# Patient Record
Sex: FEMALE | Race: WHITE | NOT HISPANIC OR LATINO | Employment: OTHER | URBAN - METROPOLITAN AREA
[De-identification: names, ages, dates, MRNs, and addresses within clinical notes are randomized per-mention and may not be internally consistent; named-entity substitution may affect disease eponyms.]

---

## 2017-03-31 ENCOUNTER — OFFICE VISIT - HEALTHEAST (OUTPATIENT)
Dept: FAMILY MEDICINE | Facility: CLINIC | Age: 52
End: 2017-03-31

## 2017-03-31 DIAGNOSIS — M25.562 LEFT LATERAL KNEE PAIN: ICD-10-CM

## 2017-03-31 ASSESSMENT — MIFFLIN-ST. JEOR: SCORE: 1369.75

## 2017-04-18 ENCOUNTER — RECORDS - HEALTHEAST (OUTPATIENT)
Dept: ADMINISTRATIVE | Facility: OTHER | Age: 52
End: 2017-04-18

## 2017-04-24 ENCOUNTER — HOSPITAL ENCOUNTER (OUTPATIENT)
Dept: MRI IMAGING | Facility: CLINIC | Age: 52
Discharge: HOME | End: 2017-04-24
Attending: ORTHOPAEDIC SURGERY

## 2017-04-24 DIAGNOSIS — M25.562 LEFT KNEE PAIN: ICD-10-CM

## 2017-05-01 ENCOUNTER — RECORDS - HEALTHEAST (OUTPATIENT)
Dept: ADMINISTRATIVE | Facility: OTHER | Age: 52
End: 2017-05-01

## 2017-05-02 ENCOUNTER — COMMUNICATION - HEALTHEAST (OUTPATIENT)
Dept: FAMILY MEDICINE | Facility: CLINIC | Age: 52
End: 2017-05-02

## 2017-05-03 ENCOUNTER — COMMUNICATION - HEALTHEAST (OUTPATIENT)
Dept: FAMILY MEDICINE | Facility: CLINIC | Age: 52
End: 2017-05-03

## 2017-05-19 ENCOUNTER — OFFICE VISIT - HEALTHEAST (OUTPATIENT)
Dept: FAMILY MEDICINE | Facility: CLINIC | Age: 52
End: 2017-05-19

## 2017-05-19 DIAGNOSIS — S83.289A LATERAL MENISCUS TEAR: ICD-10-CM

## 2017-05-19 DIAGNOSIS — M25.562 KNEE PAIN, LEFT: ICD-10-CM

## 2017-05-19 DIAGNOSIS — Z01.818 VISIT FOR PRE-OPERATIVE EXAMINATION: ICD-10-CM

## 2017-05-19 ASSESSMENT — MIFFLIN-ST. JEOR: SCORE: 1395.55

## 2017-05-24 ENCOUNTER — SURGERY - HEALTHEAST (OUTPATIENT)
Dept: SURGERY | Facility: CLINIC | Age: 52
End: 2017-05-24

## 2017-05-24 ENCOUNTER — ANESTHESIA - HEALTHEAST (OUTPATIENT)
Dept: SURGERY | Facility: CLINIC | Age: 52
End: 2017-05-24

## 2017-05-24 ASSESSMENT — MIFFLIN-ST. JEOR: SCORE: 1375.99

## 2017-06-06 ENCOUNTER — RECORDS - HEALTHEAST (OUTPATIENT)
Dept: ADMINISTRATIVE | Facility: OTHER | Age: 52
End: 2017-06-06

## 2017-06-12 ENCOUNTER — AMBULATORY - HEALTHEAST (OUTPATIENT)
Dept: ADMINISTRATIVE | Facility: REHABILITATION | Age: 52
End: 2017-06-12

## 2017-06-12 DIAGNOSIS — M25.562 LEFT KNEE PAIN: ICD-10-CM

## 2017-06-13 ENCOUNTER — OFFICE VISIT - HEALTHEAST (OUTPATIENT)
Dept: PHYSICAL THERAPY | Facility: REHABILITATION | Age: 52
End: 2017-06-13

## 2017-06-13 DIAGNOSIS — Z98.890 S/P ARTHROSCOPY OF LEFT KNEE: ICD-10-CM

## 2017-06-13 DIAGNOSIS — G89.18 POST-OP PAIN: ICD-10-CM

## 2017-06-13 DIAGNOSIS — M25.469 EFFUSION OF LOWER LEG JOINT: ICD-10-CM

## 2017-06-13 DIAGNOSIS — M25.662 KNEE STIFFNESS, LEFT: ICD-10-CM

## 2017-06-16 ENCOUNTER — OFFICE VISIT - HEALTHEAST (OUTPATIENT)
Dept: PHYSICAL THERAPY | Facility: REHABILITATION | Age: 52
End: 2017-06-16

## 2017-06-16 DIAGNOSIS — M25.469 EFFUSION OF LOWER LEG JOINT: ICD-10-CM

## 2017-06-16 DIAGNOSIS — Z98.890 S/P ARTHROSCOPY OF LEFT KNEE: ICD-10-CM

## 2017-06-16 DIAGNOSIS — G89.18 POST-OP PAIN: ICD-10-CM

## 2017-06-16 DIAGNOSIS — M25.662 KNEE STIFFNESS, LEFT: ICD-10-CM

## 2017-06-19 ENCOUNTER — OFFICE VISIT - HEALTHEAST (OUTPATIENT)
Dept: PHYSICAL THERAPY | Facility: REHABILITATION | Age: 52
End: 2017-06-19

## 2017-06-19 DIAGNOSIS — M25.662 KNEE STIFFNESS, LEFT: ICD-10-CM

## 2017-06-19 DIAGNOSIS — M25.469 EFFUSION OF LOWER LEG JOINT: ICD-10-CM

## 2017-06-19 DIAGNOSIS — G89.18 POST-OP PAIN: ICD-10-CM

## 2017-06-19 DIAGNOSIS — Z98.890 S/P ARTHROSCOPY OF LEFT KNEE: ICD-10-CM

## 2017-06-23 ENCOUNTER — OFFICE VISIT - HEALTHEAST (OUTPATIENT)
Dept: PHYSICAL THERAPY | Facility: REHABILITATION | Age: 52
End: 2017-06-23

## 2017-06-23 DIAGNOSIS — G89.18 POST-OP PAIN: ICD-10-CM

## 2017-06-23 DIAGNOSIS — M25.469 EFFUSION OF LOWER LEG JOINT: ICD-10-CM

## 2017-06-23 DIAGNOSIS — Z98.890 S/P ARTHROSCOPY OF LEFT KNEE: ICD-10-CM

## 2017-06-23 DIAGNOSIS — M25.662 KNEE STIFFNESS, LEFT: ICD-10-CM

## 2017-06-26 ENCOUNTER — OFFICE VISIT - HEALTHEAST (OUTPATIENT)
Dept: PHYSICAL THERAPY | Facility: REHABILITATION | Age: 52
End: 2017-06-26

## 2017-06-26 DIAGNOSIS — M25.469 EFFUSION OF LOWER LEG JOINT: ICD-10-CM

## 2017-06-26 DIAGNOSIS — Z98.890 S/P ARTHROSCOPY OF LEFT KNEE: ICD-10-CM

## 2017-06-26 DIAGNOSIS — M25.662 KNEE STIFFNESS, LEFT: ICD-10-CM

## 2017-06-30 ENCOUNTER — OFFICE VISIT - HEALTHEAST (OUTPATIENT)
Dept: PHYSICAL THERAPY | Facility: REHABILITATION | Age: 52
End: 2017-06-30

## 2017-06-30 DIAGNOSIS — G89.18 POST-OP PAIN: ICD-10-CM

## 2017-06-30 DIAGNOSIS — M25.469 EFFUSION OF LOWER LEG JOINT: ICD-10-CM

## 2017-06-30 DIAGNOSIS — M25.662 KNEE STIFFNESS, LEFT: ICD-10-CM

## 2017-06-30 DIAGNOSIS — Z98.890 S/P ARTHROSCOPY OF LEFT KNEE: ICD-10-CM

## 2017-07-03 ENCOUNTER — OFFICE VISIT - HEALTHEAST (OUTPATIENT)
Dept: PHYSICAL THERAPY | Facility: REHABILITATION | Age: 52
End: 2017-07-03

## 2017-07-03 DIAGNOSIS — Z98.890 S/P ARTHROSCOPY OF LEFT KNEE: ICD-10-CM

## 2017-07-03 DIAGNOSIS — M25.469 EFFUSION OF LOWER LEG JOINT: ICD-10-CM

## 2017-07-03 DIAGNOSIS — M25.662 KNEE STIFFNESS, LEFT: ICD-10-CM

## 2017-07-03 DIAGNOSIS — G89.18 POST-OP PAIN: ICD-10-CM

## 2017-07-07 ENCOUNTER — OFFICE VISIT - HEALTHEAST (OUTPATIENT)
Dept: PHYSICAL THERAPY | Facility: REHABILITATION | Age: 52
End: 2017-07-07

## 2017-07-07 DIAGNOSIS — M25.469 EFFUSION OF LOWER LEG JOINT: ICD-10-CM

## 2017-07-07 DIAGNOSIS — M25.662 KNEE STIFFNESS, LEFT: ICD-10-CM

## 2017-07-07 DIAGNOSIS — G89.18 POST-OP PAIN: ICD-10-CM

## 2017-07-07 DIAGNOSIS — Z98.890 S/P ARTHROSCOPY OF LEFT KNEE: ICD-10-CM

## 2017-07-10 ENCOUNTER — RECORDS - HEALTHEAST (OUTPATIENT)
Dept: ADMINISTRATIVE | Facility: OTHER | Age: 52
End: 2017-07-10

## 2017-08-28 ENCOUNTER — RECORDS - HEALTHEAST (OUTPATIENT)
Dept: ADMINISTRATIVE | Facility: OTHER | Age: 52
End: 2017-08-28

## 2017-09-24 ENCOUNTER — HEALTH MAINTENANCE LETTER (OUTPATIENT)
Age: 52
End: 2017-09-24

## 2018-01-30 ENCOUNTER — OFFICE VISIT - HEALTHEAST (OUTPATIENT)
Dept: FAMILY MEDICINE | Facility: CLINIC | Age: 53
End: 2018-01-30

## 2018-01-30 DIAGNOSIS — Z76.89 ESTABLISHING CARE WITH NEW DOCTOR, ENCOUNTER FOR: ICD-10-CM

## 2018-01-30 DIAGNOSIS — Z23 NEED FOR VACCINATION: ICD-10-CM

## 2018-01-30 DIAGNOSIS — Z12.31 VISIT FOR SCREENING MAMMOGRAM: ICD-10-CM

## 2018-01-30 DIAGNOSIS — D22.9 ATYPICAL MOLE: ICD-10-CM

## 2018-01-30 ASSESSMENT — MIFFLIN-ST. JEOR: SCORE: 1438.64

## 2018-01-31 ENCOUNTER — RECORDS - HEALTHEAST (OUTPATIENT)
Dept: ADMINISTRATIVE | Facility: OTHER | Age: 53
End: 2018-01-31

## 2018-04-27 ENCOUNTER — COMMUNICATION - HEALTHEAST (OUTPATIENT)
Dept: SCHEDULING | Facility: CLINIC | Age: 53
End: 2018-04-27

## 2018-04-27 ENCOUNTER — OFFICE VISIT - HEALTHEAST (OUTPATIENT)
Dept: FAMILY MEDICINE | Facility: CLINIC | Age: 53
End: 2018-04-27

## 2018-04-27 DIAGNOSIS — J20.9 BRONCHITIS, ACUTE: ICD-10-CM

## 2018-08-20 ENCOUNTER — COMMUNICATION - HEALTHEAST (OUTPATIENT)
Dept: FAMILY MEDICINE | Facility: CLINIC | Age: 53
End: 2018-08-20

## 2018-08-21 ENCOUNTER — AMBULATORY - HEALTHEAST (OUTPATIENT)
Dept: FAMILY MEDICINE | Facility: CLINIC | Age: 53
End: 2018-08-21

## 2018-08-21 DIAGNOSIS — M25.562 CHRONIC PAIN OF LEFT KNEE: ICD-10-CM

## 2018-08-21 DIAGNOSIS — G89.29 CHRONIC PAIN OF LEFT KNEE: ICD-10-CM

## 2018-08-27 ENCOUNTER — RECORDS - HEALTHEAST (OUTPATIENT)
Dept: ADMINISTRATIVE | Facility: OTHER | Age: 53
End: 2018-08-27

## 2020-02-25 ENCOUNTER — COMMUNICATION - HEALTHEAST (OUTPATIENT)
Dept: SCHEDULING | Facility: CLINIC | Age: 55
End: 2020-02-25

## 2020-02-25 ENCOUNTER — RECORDS - HEALTHEAST (OUTPATIENT)
Dept: ADMINISTRATIVE | Facility: OTHER | Age: 55
End: 2020-02-25

## 2020-10-01 ENCOUNTER — OFFICE VISIT - HEALTHEAST (OUTPATIENT)
Dept: FAMILY MEDICINE | Facility: CLINIC | Age: 55
End: 2020-10-01

## 2020-10-01 DIAGNOSIS — Z23 NEED FOR VACCINATION: ICD-10-CM

## 2020-10-01 DIAGNOSIS — N83.8 OVARIAN MASS: ICD-10-CM

## 2020-10-01 DIAGNOSIS — Z12.31 VISIT FOR SCREENING MAMMOGRAM: ICD-10-CM

## 2020-10-01 DIAGNOSIS — Z12.11 COLON CANCER SCREENING: ICD-10-CM

## 2020-10-01 ASSESSMENT — MIFFLIN-ST. JEOR: SCORE: 1530.78

## 2021-01-29 ENCOUNTER — COMMUNICATION - HEALTHEAST (OUTPATIENT)
Dept: FAMILY MEDICINE | Facility: CLINIC | Age: 56
End: 2021-01-29

## 2021-01-29 ENCOUNTER — AMBULATORY - HEALTHEAST (OUTPATIENT)
Dept: FAMILY MEDICINE | Facility: CLINIC | Age: 56
End: 2021-01-29

## 2021-01-29 DIAGNOSIS — Z11.1 SCREENING EXAMINATION FOR PULMONARY TUBERCULOSIS: ICD-10-CM

## 2021-02-02 ENCOUNTER — AMBULATORY - HEALTHEAST (OUTPATIENT)
Dept: NURSING | Facility: CLINIC | Age: 56
End: 2021-02-02

## 2021-02-02 DIAGNOSIS — Z11.1 SCREENING EXAMINATION FOR PULMONARY TUBERCULOSIS: ICD-10-CM

## 2021-02-04 ENCOUNTER — AMBULATORY - HEALTHEAST (OUTPATIENT)
Dept: NURSING | Facility: CLINIC | Age: 56
End: 2021-02-04

## 2021-05-30 VITALS — HEIGHT: 66 IN | WEIGHT: 168.44 LBS | BODY MASS INDEX: 27.07 KG/M2

## 2021-05-30 VITALS — HEIGHT: 67 IN | BODY MASS INDEX: 26.24 KG/M2 | WEIGHT: 167.19 LBS

## 2021-05-30 VITALS — HEIGHT: 66 IN | WEIGHT: 174.13 LBS | BODY MASS INDEX: 27.98 KG/M2

## 2021-05-31 ENCOUNTER — RECORDS - HEALTHEAST (OUTPATIENT)
Dept: ADMINISTRATIVE | Facility: CLINIC | Age: 56
End: 2021-05-31

## 2021-05-31 VITALS — BODY MASS INDEX: 28.41 KG/M2 | HEIGHT: 67 IN | WEIGHT: 181 LBS

## 2021-06-01 ENCOUNTER — RECORDS - HEALTHEAST (OUTPATIENT)
Dept: ADMINISTRATIVE | Facility: CLINIC | Age: 56
End: 2021-06-01

## 2021-06-01 VITALS — BODY MASS INDEX: 28.86 KG/M2 | WEIGHT: 184.25 LBS

## 2021-06-02 ENCOUNTER — RECORDS - HEALTHEAST (OUTPATIENT)
Dept: ADMINISTRATIVE | Facility: CLINIC | Age: 56
End: 2021-06-02

## 2021-06-04 VITALS
BODY MASS INDEX: 31.6 KG/M2 | HEIGHT: 67 IN | DIASTOLIC BLOOD PRESSURE: 82 MMHG | OXYGEN SATURATION: 98 % | RESPIRATION RATE: 18 BRPM | SYSTOLIC BLOOD PRESSURE: 118 MMHG | TEMPERATURE: 98.2 F | HEART RATE: 76 BPM | WEIGHT: 201.31 LBS

## 2021-06-06 NOTE — TELEPHONE ENCOUNTER
"Pt reports \"chest pressure.\"  \"First started 6-to-8 months ago.  \"Only happened 2-to-3 x over these past months.\"    Yesterday occurred \"just once.\"  Lasted \"about one minute.\"  \"Just sat in a chair and took deep breaths until pressure subsided.\"  \"Thought maybe I was having a heart attack.\"    \"Today still having a vague sensation in the chest.\"  \"Ran up the stairs and felt a little winded today.\"  \"Feeling a little off today.\"    Advised ED eval.  Pt declines ED; asks for some other option.  Suggested pt may start at [facility], while remaining flexible in the event actual hospital intervention is needed.    Elba Celis RN  [facility] Triage     Reason for Disposition    Intermittent chest pain and pain has been increasing in severity or frequency    Protocols used: CHEST PAIN-A-OH      "

## 2021-06-09 NOTE — PROGRESS NOTES
"Assessment:     There are no diagnoses linked to this encounter.       Plan:        1. Left lateral knee pain  Ambulatory referral to Orthopedics       We reviewed potential etiologies for the knee symptoms and I will refer to ortho for further evaluation. We discussed quadriceps strengthening exercises, avoidance of aggravating activities, and use of a neoprene knee support with the patella exposed. She will use otc analgesics and call or return to clinic with worsening or persisting symptoms.      Subjective:         Ghada Ochoa is a 51 y.o. female who presents with knee pain involving the left knee. Onset was gradual, starting about 10 days ago and getting progressively worse. Inciting event: none known, but she had been flying for 5 hours prior to onset. Current symptoms include: pain located anterolateral inferior aspect of knee, deep pain. She denies giving out, locking, popping sensation and swelling. Pain is aggravated by going up and down stairs and standing. Patient has had no prior knee problems. Evaluation to date: none. Treatment to date: avoidance of offending activity, ice and OTC analgesics which are somewhat effective.    Review of Systems  Pertinent items are noted in HPI.     Objective:      /74  Pulse 64  Ht 5' 6.25\" (1.683 m)  Wt 168 lb 7 oz (76.4 kg)  BMI 26.98 kg/m2    GEN: Alert and oriented, NAD, well nourished  SKIN:  Normal skin turgor, no lesions/rashes   HEENT: NC/AT, moist mucous membranes, no rhinorrhea.    NECK: Normal.  No adenopathy or thyromegaly.  CV: Regular rate and rhythm, no murmurs.   LUNGS: Clear to auscultation bilaterally.    ABDOMEN: Soft, non-tender, non-distended, no masses   BACK: Normal  EXTREMITY: No edema, cyanosis  NEURO: Grossly normal.       Right knee: normal and no effusion, full active range of motion, no joint line tenderness, ligamentous structures intact.   Left knee:  mild effusion. Full active range of motion, no joint line tenderness, " ligamentous structures intact.

## 2021-06-10 NOTE — ANESTHESIA CARE TRANSFER NOTE
Last vitals:   Vitals:    05/24/17 1543   BP: 108/58   Pulse: 97   Resp: 12   Temp: 36.4  C (97.5  F)   SpO2: 99%     Patient's level of consciousness is awake and drowsy  Spontaneous respirations: yes  Maintains airway independently: yes  Dentition unchanged: yes  Oropharynx: oropharynx clear of all foreign objects    QCDR Measures:  ASA# 20 - Surgical Safety Checklist: ASA20A - Safety Checks Done  PQRS# 430 - Adult PONV Prevention: 4558F - Pt received => 2 anti-emetic agents (different classes) preop & intraop  ASA# 8 - Peds PONV Prevention: NA - Not pediatric patient, not GA or 2 or more risk factors NOT present  PQRS# 424 - Meera-op Temp Management: 4559F - At least one body temp DOCUMENTED => 35.5C or 95.9F within required timeframe  PQRS# 426 - PACU Transfer Protocol: - Transfer of care checklist used  ASA# 14 - Acute Post-op Pain: ASA14B - Patient did NOT experience pain >= 7 out of 10

## 2021-06-10 NOTE — ANESTHESIA POSTPROCEDURE EVALUATION
Patient: Ghada Ochoa  LEFT KNEE ARTHROSCOPY WITH SUBTOTAL LATERAL MENISCECTOMY  Anesthesia type: general    Patient location: Phase II Recovery  Last vitals:   Vitals:    05/24/17 1600   BP: 127/63   Pulse: 71   Resp: 16   Temp:    SpO2: 99%     Post vital signs: stable  Level of consciousness: awake, alert and oriented  Post-anesthesia pain: pain controlled  Post-anesthesia nausea and vomiting: no  Pulmonary: unassisted, return to baseline  Cardiovascular: stable and blood pressure at baseline  Hydration: adequate  Anesthetic events: no    QCDR Measures:  ASA# 11 - Meera-op Cardiac Arrest: ASA11B - Patient did NOT experience unanticipated cardiac arrest  ASA# 12 - Meera-op Mortality Rate: ASA12B - Patient did NOT die  ASA# 13 - PACU Re-Intubation Rate: NA - No ETT / LMA used for case  ASA# 10 - Composite Anes Safety: ASA10A - No serious adverse event  ASA# 38 - New Corneal Injury: ASA38A - No new exposure keratitis or corneal abrasion in PACU    Additional Notes:   - - -

## 2021-06-10 NOTE — PROGRESS NOTES
Assessment/Plan:      Visit for Preoperative Exam.  1. Visit for pre-operative examination    2. Lateral meniscus tear    3. Knee pain, left        Patient approved for surgery with general or local anesthesia. Labs drawn as indicated below. We reviewed holding NSAIDs at least 7 days prior to surgery. We reviewed indications for re-evaluation, and she will follow up as needed.      Patient is approved for surgery and is considered to be mod anesthetic risk.      Subjective:     Scheduled Procedure: Left Knee Arthroscopy  Surgery Date:  05/24/17  Surgery Location:  [facility]   Surgeon:  Dr Wright    History of Present Illness      Ghada Ochoa is a 51 y.o. year-old female who presents for a pre-op exam prior to undergoing knee arthroscopy for probable lateral meniscal tear. She has had pain for the past 2 mos.    Recent Health  Fever: no  Chills: no  Fatigue: no  Chest Pain: no  Cough: no  Dyspnea: no  Urinary Frequency: no  Nausea: no  Vomiting: no  Diarrhea: no  Abdominal Pain: no  Easy Bruising: no  Lower Extremity Swelling: no  Poor Exercise Tolerance: no    Pertinent History  Prior Anesthesia: yes  Previous Anesthesia Reaction:  no  Diabetes: no  Cardiovascular Disease: no  Pulmonary Disease: no  Renal Disease: no  GI Disease: no  Sleep Apnea: no  Thromboembolic Problems: no  Clotting Disorder: no  Bleeding Disorder: no  Transfusion Reaction: n/a  Impaired Immunity: no  Steroid use in the last 6 months: no  Frequent Aspirin use: yes - started baby asa preoperatively    Family history negative for anesthesia reaction, Stroke, Aneurysm, sudden death, clotting disorder and bleeding disorder    Father had CAD, s/p MI.     Social history of patient does not wear denture or partial plates, there is no transfusion refusal, NSAID use and there are no concerns regarding care after surgery    After surgery, the patient plans to recover at home with family.    Current Outpatient Prescriptions   Medication Sig Dispense  Refill     aspirin 81 MG EC tablet Take 81 mg by mouth daily.       ibuprofen (ADVIL,MOTRIN) 400 MG tablet Take 400 mg by mouth every 6 (six) hours as needed for pain.       LACTOBAC CMB #3/FOS/PANTETHINE (PROBIOTIC & ACIDOPHILUS ORAL) Take by mouth.       MULTIVIT WITH CALCIUM,IRON,MIN (MULTIPLE VITAMIN, WOMENS ORAL) Take by mouth.       No current facility-administered medications for this visit.        No Known Allergies    Immunization History   Administered Date(s) Administered     Td, historic 06/26/2000, 10/09/2007     Tdap 10/09/2007       Patient Active Problem List   Diagnosis     Lateral meniscus tear     Knee pain, left       Past Medical History:   Diagnosis Date     Gallstone pancreatitis      Ovarian mass 2009    cellular fibroma       Past Surgical History:   Procedure Laterality Date     partial oopherectomy due to 9 cm mass       CO APPENDECTOMY      Description: Appendectomy;  Recorded: 05/28/2009;     CO LAP,CHOLECYSTECTOMY      Description: Cholecystectomy Laparoscopic;  Recorded: 05/28/2009;     CO REMOVE TONSILS/ADENOIDS,<13 Y/O      Description: Tonsillectomy With Adenoidectomy;  Recorded: 12/11/2007;       Family History   Problem Relation Age of Onset     Heart disease Father      Cancer Father      waldenstrom's macroglobulinemia       Social History     Social History     Marital status:      Spouse name: Toribio     Number of children: 5     Years of education: N/A     Occupational History      [employer]     Social History Main Topics     Smoking status: Never Smoker     Smokeless tobacco: Never Used     Alcohol use 0.5 oz/week     1 drink(s) per week     Drug use: No     Sexual activity: Yes     Partners: Male     Birth control/ protection: Surgical     Other Topics Concern     Not on file     Social History Narrative       Review of Systems  A 12 point comprehensive review of systems was negative except as noted.       Objective:       Vitals:    05/19/17 1419   BP:  "122/68   Pulse: (!) 52   Weight: 174 lb 2 oz (79 kg)   Height: 5' 6.25\" (1.683 m)     Physical Exam:  General: Alert, cooperative, no distress  Head: Normocephalic, without obvious abnormality, atraumatic  Eyes: PERRL, conjunctiva/corneas clear, EOM's intact  Ears: Normal TM's and external ear canals, both ears  Nose: Nares normal, septum midline,mucosa normal, no drainage  Throat: Lips, mucosa, and tongue normal; teeth and gums normal  Neck: Supple, symmetrical, trachea midline, no adenopathy;  thyroid normal  Back: Symmetric, no curvature, ROM normal, no CVA tenderness  Lungs: Clear to auscultation bilaterally, respirations unlabored  Heart: Regular rate and rhythm, S1 and S2 normal, no murmur, rub, or gallop  Abdomen: Soft, non-tender, no masses, no organomegaly  Extremities: Extremities normal, atraumatic, no cyanosis or edema  Skin: Skin color, texture, turgor normal, no rashes or lesions  Lymph nodes: Cervical, supraclavicular, and axillary nodes normal  Neurologic: Normal        Results for orders placed or performed in visit on 05/19/17   Hemoglobin   Result Value Ref Range    Hemoglobin 12.7 12.0 - 16.0 g/dL                "

## 2021-06-10 NOTE — ANESTHESIA PROCEDURE NOTES
Peripheral Block    Start time: 5/24/2017 2:28 PM  End time: 5/24/2017 2:29 PM    Staffing:  Performing  Anesthesiologist: VIANNEY METZGER  Performing CRNA: KATHRYN ELLIS  Preanesthetic Checklist  Completed: patient identified, site marked, risks, benefits, and alternatives discussed, timeout performed, consent obtained, at patient's request, airway assessed, oxygen available, suction available, emergency drugs available and hand hygiene performed  Peripheral Block  Block type: knee, intraarticular  Prep: ChloraPrep  Patient position: supine  Patient monitoring: cardiac monitor, continuous pulse oximetry, heart rate and blood pressure  Laterality: left          Needle  Needle gauge: 19 G  no peripheral nerve catheter placed  Assessment  Injection assessment: no difficulty with injection, negative aspiration for heme, no paresthesia on injection and incremental injection

## 2021-06-10 NOTE — ANESTHESIA PROCEDURE NOTES
Peripheral Block    Start time: 5/24/2017 2:27 PM  End time: 5/24/2017 2:28 PM    Staffing:  Performing  Anesthesiologist: VIANNEY METZGER  Performing CRNA: KATHRYN ELLIS  Preanesthetic Checklist  Completed: patient identified, site marked, risks, benefits, and alternatives discussed, timeout performed, consent obtained, at patient's request, airway assessed, oxygen available, suction available, emergency drugs available and hand hygiene performed  Peripheral Block  Block type: knee, ports - 2  Prep: ChloraPrep  Patient position: supine  Patient monitoring: cardiac monitor, continuous pulse oximetry, heart rate and blood pressure  Laterality: left          Needle  Needle gauge: 22 G  no peripheral nerve catheter placed  Assessment  Injection assessment: no difficulty with injection, negative aspiration for heme, no paresthesia on injection and incremental injection

## 2021-06-10 NOTE — ANESTHESIA PREPROCEDURE EVALUATION
Anesthesia Evaluation      Patient summary reviewed   History of anesthetic complications (slow emergence)     Airway   Mallampati: III  Neck ROM: full   Pulmonary - normal exam    breath sounds clear to auscultation  (+) a smoker                         Cardiovascular - normal exam  Exercise tolerance: > or = 4 METS  Rhythm: regular  Rate: normal,         Neuro/Psych      Endo/Other    (+) obesity,      GI/Hepatic/Renal      Comments: H/o pancreatitis          Dental    (+) poor dentition and chipped                       Anesthesia Plan  Planned anesthetic: general mask  Mask ga + knee block per surgeon  ASA 2   Induction: intravenous   Anesthetic plan and risks discussed with: patient  Anesthesia plan special considerations: antiemetics,   Post-op plan: routine recovery

## 2021-06-11 NOTE — PROGRESS NOTES
[facility] Daily Progress     Patient Name: Ghada Ochoa  Date: 2017  Visit #: 2  Referral Diagnosis: Left knee pain  Referring provider: Es Saenz PA*  Visit Diagnosis:     ICD-10-CM    1. S/P arthroscopy of left knee Z98.890    2. Knee stiffness, left M25.662    3. Post-op pain G89.18    4. Effusion of lower leg joint M25.469          Assessment:     Response to Intervention:  Excellent response to therapy.  Patient is very receptive to cuing and suggestions for gait and posture.  She is very motivated and compliant with PT>    Symptoms are consistent with:    Patient is appropriate to continue with skilled physical therapy intervention, as indicated by initial plan of care.    Goal Status:  Pt. will demonstrate/verbalize independence in self-management of condition in : 4 weeks  Pt. will be independent with home exercise program in : 4 weeks  Pt. will be able to walk : with no pain;in 4 weeks;for household mobility;with less difficulty  Patient will ascend / descend: stairs;with railing;with recipricol gait;with no pain;in 4 weeks  No Data Recorded  Other functional progress:           Plan / Patient Education:     Continue with initial plan of care.  Progress with home program as tolerated.       Subjective:     Pain Ratin    Knee is feeling better, but increased activity continues to make it more sore or stiff later in the day or the next morning.    Patient feels like she is still favoring that leg during gait and standing.      Objective:       Left knee AROM 5-115 pre-treatment, 0-120 post treatment .    Patient still has tendency to not straighten knee completely during swing phase of gait, reducing heel strike and push off.    Patient tendency to stand with weight shifted to right LE.    Manual Therapy  MFR layers 1-3 left; quad, hamstring, gastroc, fibularis     Manual therapy:  left patella superior mobilization     Rate/grade Target  Direction  Relative movement  Location in range Patient position   2,3 Inferior border of patella Superior   Superior glide of patella in the femoral groove. Full knee extension  Supine        Manual therapy:  left patella medial mobilization     Rate/grade Target  Direction  Relative movement Location in range Patient position   2,3 Lateral border of patella Medial   Medial glide of patella on the femoral groove. Full knee extension  Supine             Exercises:  Exercise #1: LAQ  without weight  Comment #1: 10 bilateral 2-3x/day  Verbal, written/picture instruction provided for HEP.  Exercise #2: prone knee flexion  Comment #2: 10 bilateral 2-3x/day  Verbal, written/picture instruction provided for HEP.  Exercise #3: supine hamstring glide  Comment #3: 10 bilateral 2-3x/day  Verbal, written/picture instruction provided for HEP.  Exercise #4: sidelying hip abduction  Comment #4: 10 bilateral 2-3x/day  Verbal, written/picture instruction provided for HEP.       Treatment Today    TREATMENT MINUTES COMMENTS   Evaluation     Self-care/ Home management     Manual therapy 18 See above.   Neuromuscular Re-education     Therapeutic Activity     Therapeutic Exercises 3 See exercise flow-sheet for details.    Gait training 8    Modality__________________                Total 29    Blank areas are intentional and mean the treatment did not include these items.       Christopher Acevedo, PT  6/16/2017

## 2021-06-11 NOTE — PROGRESS NOTES
[facility] Daily Progress     Patient Name: Ghada Ochoa  Date: 6/30/2017  Visit #: 6  Referral Diagnosis: Left knee pain  Referring provider: Es Saenz PA*  Visit Diagnosis:     ICD-10-CM    1. S/P arthroscopy of left knee Z98.890    2. Knee stiffness, left M25.662    3. Effusion of lower leg joint M25.469    4. Post-op pain G89.18          Assessment:     Response to Intervention:  Excellent response to therapy.  Patient is very receptive to cuing and suggestions for gait and posture.  She is very motivated and compliant with PT.    Symptoms are consistent with:  Post op recovery.  Patient is appropriate to continue with skilled physical therapy intervention, as indicated by initial plan of care.    Goal Status:  Pt. will demonstrate/verbalize independence in self-management of condition in : 4 weeks;Progressing toward  Pt. will be independent with home exercise program in : 4 weeks;Progressing toward  Pt. will be able to walk : with no pain;in 4 weeks;for household mobility;with less difficulty;Progressing toward  Patient will ascend / descend: stairs;with railing;with recipricol gait;with no pain;in 4 weeks;Progressing toward  No Data Recorded  Other functional progress:           Plan / Patient Education:     Continue with initial plan of care.  Progress with home program as tolerated.  Bike, possible trials of total gym vs regular squats, lunges, lateral movement, balance.    Subjective:     Pain Rating: 3-4/10    Patient reports some increased soreness/stiffness today from increased activity.    Patient feels overall improvement.        Objective:       Left knee AROM 0-110 .  Post treatment 0-115.    Circumference  Left 42 cm.    Manual Therapy  MFR layers 1-3 left; quad, hamstring, gastroc, articularis genu,fibularis longus       Fibular head pistoning grade 2,3 hooklying.       Exercises below represent all exercise the patient has done in the clinic and HEP.    Please see  "today's exercise flow-sheet for specifics of today's exercise performance.     Exercises:  Exercise #1: LAQ  without weight  Comment #1: 10 bilateral 2-3x/day  Verbal, written/picture instruction provided for HEP.  Exercise #2: heel slide stretch  Comment #2: x 8  Exercise #3: supine hamstring glide  Comment #3: 10 bilateral 2-3x/day  Verbal, written/picture instruction provided for HEP.  Exercise #4: sidelying hip abduction  Comment #4: 10 bilateral 2-3x/day  Verbal, written/picture instruction provided for HEP.  Exercise #5: upright stationary bike,  seat height 6  Comment #5: 4 minutes , level 5, full circles forward.  Exercise #6: gait training --brisk to normal speed, cuing for heel strike, push off, knee extension, smooth equal pattern.  Exercise #7: gastroc stretch wedge  Comment #7: 30\" x 2 bilateral  Exercise #8: hamstring stretch  Comment #8: 30\" x 2        Treatment Today    TREATMENT MINUTES COMMENTS   Evaluation     Self-care/ Home management     Manual therapy 25 See above.   Neuromuscular Re-education     Therapeutic Activity     Therapeutic Exercises     Gait training     Modality__________________                Total 25    Blank areas are intentional and mean the treatment did not include these items.       Christopher Acevedo, PT  6/30/2017     "

## 2021-06-11 NOTE — PROGRESS NOTES
[facility] Daily Progress     Patient Name: Ghada Ochoa  Date: 2017  Visit #: 3  Referral Diagnosis: Left knee pain  Referring provider: Es Saenz PA*  Visit Diagnosis:     ICD-10-CM    1. S/P arthroscopy of left knee Z98.890    2. Knee stiffness, left M25.662    3. Post-op pain G89.18    4. Effusion of lower leg joint M25.469          Assessment:     Response to Intervention:  Excellent response to therapy.  Patient is very receptive to cuing and suggestions for gait and posture.  She is very motivated and compliant with PT.    Symptoms are consistent with:    Patient is appropriate to continue with skilled physical therapy intervention, as indicated by initial plan of care.    Goal Status:  Pt. will demonstrate/verbalize independence in self-management of condition in : 4 weeks  Pt. will be independent with home exercise program in : 4 weeks  Pt. will be able to walk : with no pain;in 4 weeks;for household mobility;with less difficulty  Patient will ascend / descend: stairs;with railing;with recipricol gait;with no pain;in 4 weeks  No Data Recorded  Other functional progress:           Plan / Patient Education:     Continue with initial plan of care.  Progress with home program as tolerated.       Subjective:     Pain Ratin    Knee is more sore and more swollen today from increased activity in the yard this weekend.        Objective:       Left knee AROM 0-102 .    Patient still has tendency to not straighten knee completely during swing phase of gait, reducing heel strike and push off.    Patient awareness of tendency to stand with weight shifted to right LE is improving.    Manual Therapy  MFR layers 1-3 left; quad, hamstring, gastroc, fibularis, anterior tibialis anterior.     Manual therapy:  left patella superior mobilization     Rate/grade Target  Direction  Relative movement Location in range Patient position   2,3 Inferior border of patella Superior   Superior glide of  patella in the femoral groove. Full knee extension  Supine        Manual therapy:  left patella medial mobilization     Rate/grade Target  Direction  Relative movement Location in range Patient position   2,3 Lateral border of patella Medial   Medial glide of patella on the femoral groove. Full knee extension  Supine         Left leg pull to 50 degrees.    Exercises:  Exercise #1: LAQ  without weight  Comment #1: 10 bilateral 2-3x/day  Verbal, written/picture instruction provided for HEP.  Exercise #2: prone knee flexion  Comment #2: 10 bilateral 2-3x/day  Verbal, written/picture instruction provided for HEP.  Exercise #3: supine hamstring glide  Comment #3: 10 bilateral 2-3x/day  Verbal, written/picture instruction provided for HEP.  Exercise #4: sidelying hip abduction  Comment #4: 10 bilateral 2-3x/day  Verbal, written/picture instruction provided for HEP.  Exercise #5: upright stationary bike,  seat height 6  Comment #5: 3 minutes , level 4, full circles forward.  Exercise #6: gait training --brisk to normal speed, cuing for heel strike, push off, knee extension, smooth equal pattern.       Treatment Today    TREATMENT MINUTES COMMENTS   Evaluation     Self-care/ Home management     Manual therapy 25 See above.   Neuromuscular Re-education     Therapeutic Activity     Therapeutic Exercises  See exercise flow-sheet for details.    Gait training     Modality__________________                Total 25    Blank areas are intentional and mean the treatment did not include these items.       Christopher Acevedo, PT  6/19/2017

## 2021-06-11 NOTE — PROGRESS NOTES
[facility] Daily Progress     Patient Name: Ghada Ochoa  Date: 7/7/2017  Visit #: 7  Referral Diagnosis: Left knee pain  Referring provider: Es Saenz PA*  Visit Diagnosis:     ICD-10-CM    1. S/P arthroscopy of left knee Z98.890    2. Knee stiffness, left M25.662    3. Effusion of lower leg joint M25.469    4. Post-op pain G89.18          Assessment:   Ghada reported feeling good since last session and consistently feeling like she can do more. PT focused on increasing strength and progressing exercise this session, with continuation of manual therapy per patient request because of positive response. Patient is progressing well in post op therapy. Manual therapy was performed to address increased effusion  and stiffness in the knee. PT plan to continue progression of HEP and manual therapy prn.     Response to Intervention:  Excellent response to therapy.  Patient is very receptive to cuing and suggestions for gait and posture.  She is very motivated and compliant with PT.     Symptoms are consistent with:  Post op recovery.  Patient is appropriate to continue with skilled physical therapy intervention, as indicated by initial plan of care.    Goal Status:  Pt. will demonstrate/verbalize independence in self-management of condition in : 4 weeks;Progressing toward  Pt. will be independent with home exercise program in : 4 weeks;Progressing toward  Pt. will be able to walk : with no pain;in 4 weeks;for household mobility;with less difficulty;Progressing toward  Patient will ascend / descend: stairs;with railing;with recipricol gait;with no pain;in 4 weeks;Progressing toward  No Data Recorded  Other functional progress:      Plan / Patient Education:     Continue with initial plan of care.  Progress with home program as tolerated.  Bike, total gym, lunges, adding band to side stepping, balance.    Subjective:     Patient feels as if she can do more and more, but presented with increased  "stiffness today because of being on her feet all day yesterday. Patient felt great after last session, and requested doing the manual again this session.     Pain Ratin, because she just took okay      Objective:       Left knee AROM 0-119 .  Post treatment 0-123. Measured on 7/3/17    Manual Therapy (not performed this session  MFR layers 1-3 left; quad, hamstring, gastroc, articularis genu,fibularis longus  Patella mobilization- inferior glide  Knee distraction with flexion    Exercises below represent all exercise the patient has done in the clinic and HEP.    Please see today's exercise flow-sheet for specifics of today's exercise performance.     Exercises:  Exercise #1: LAQ  without weight  Comment #1: 10 bilateral 2-3x/day  Verbal, written/picture instruction provided for HEP.  Exercise #2: heel slide stretch  Comment #2: x 8  Exercise #3: supine heel slides on wall x 10  Comment #3: 10 bilateral 2-3x/day  Verbal, written/picture instruction provided for HEP.  Exercise #4: sidelying hip abduction  Comment #4: 10 bilateral 2-3x/day  Verbal, written/picture instruction provided for HEP.  Exercise #5: upright stationary bike,  seat height 6  Comment #5: 5 minutes , level 5, full circles forward.  Exercise #6: gait training --brisk to normal speed, cuing for heel strike, push off, knee extension, smooth equal pattern.  Exercise #7: gastroc stretch wedge  Comment #7: 30\" x 2 bilateral  Exercise #8: hamstring stretch  Comment #8: 30\" x 2   Exercise #9: SLR supine x10 deng  Comment #9: SLS x30sec deng   Exercise #10: standing squat x10  Comment #10: side stepping x2 laps both directions  Exercise #11: step ups 8 inch 1) foward x10 deng 2) lateral x10 deng       Treatment Today    TREATMENT MINUTES COMMENTS   Evaluation     Self-care/ Home management     Manual therapy 12 Patient positioned in supine-TPR to L articularis genu, L patella mobilization inferior glide- patient in hooklying,   Patient positioned seated on " plinth-L  knee distraction with varying degrees of flexion    Neuromuscular Re-education     Therapeutic Activity     Therapeutic Exercises 18 See flow sheet.   Gait training     Modality__________________                Total 30    Blank areas are intentional and mean the treatment did not include these items.     Nikia Ruiz, SPT  Venecia Connor, PT  7/7/2017

## 2021-06-11 NOTE — PROGRESS NOTES
"[facility] Daily Progress     Patient Name: Ghada Ochoa  Date: 2017  Visit #: 4  Referral Diagnosis: Left knee pain  Referring provider: Es Saenz PA*  Visit Diagnosis:     ICD-10-CM    1. S/P arthroscopy of left knee Z98.890    2. Knee stiffness, left M25.662    3. Post-op pain G89.18    4. Effusion of lower leg joint M25.469          Assessment:     Response to Intervention:  Excellent response to therapy.  Patient is very receptive to cuing and suggestions for gait and posture.  She is very motivated and compliant with PT.    Symptoms are consistent with:  Post op recovery.  Patient is appropriate to continue with skilled physical therapy intervention, as indicated by initial plan of care.    Goal Status:  Pt. will demonstrate/verbalize independence in self-management of condition in : 4 weeks;Progressing toward  Pt. will be independent with home exercise program in : 4 weeks;Progressing toward  Pt. will be able to walk : with no pain;in 4 weeks;for household mobility;with less difficulty;Progressing toward  Patient will ascend / descend: stairs;with railing;with recipricol gait;with no pain;in 4 weeks;Progressing toward  No Data Recorded  Other functional progress:           Plan / Patient Education:     Continue with initial plan of care.  Progress with home program as tolerated.       Subjective:     Pain Ratin    Knee is feeling much better than last visit.    It is stiff and feels a little \"stumpy\"        Objective:       Left knee AROM 0-114 .  Post treatment 0-122.    Circumference  Left 42 cm.    Manual Therapy  MFR layers 1-3 left; quad, hamstring, gastroc,articularis genu.     Manual therapy:  left patella superior mobilization     Rate/grade Target  Direction  Relative movement Location in range Patient position   2,3 Inferior border of patella Superior   Superior glide of patella in the femoral groove. Full knee extension  Supine               Exercises below " represent all exercise the patient has done in the clinic and HEP.    Please see today's exercise flow-sheet for specifics of today's exercise performance.     Exercises:  Exercise #1: LAQ  without weight  Comment #1: 10 bilateral 2-3x/day  Verbal, written/picture instruction provided for HEP.  Exercise #2: prone knee flexion  Comment #2: 10 bilateral 2-3x/day  Verbal, written/picture instruction provided for HEP.  Exercise #3: supine hamstring glide  Comment #3: 10 bilateral 2-3x/day  Verbal, written/picture instruction provided for HEP.  Exercise #4: sidelying hip abduction  Comment #4: 10 bilateral 2-3x/day  Verbal, written/picture instruction provided for HEP.  Exercise #5: upright stationary bike,  seat height 6  Comment #5: 3 minutes , level 4, full circles forward.  Exercise #6: gait training --brisk to normal speed, cuing for heel strike, push off, knee extension, smooth equal pattern.       Treatment Today    TREATMENT MINUTES COMMENTS   Evaluation     Self-care/ Home management     Manual therapy 17 See above.   Neuromuscular Re-education     Therapeutic Activity     Therapeutic Exercises 10 See exercise flow-sheet for details.    Gait training     Modality__________________                Total 27    Blank areas are intentional and mean the treatment did not include these items.       Christopher Acevedo, PT  6/23/2017

## 2021-06-11 NOTE — PROGRESS NOTES
[facility] Daily Progress     Patient Name: Ghada Ochoa  Date: 2017  Visit #: 5  Referral Diagnosis: Left knee pain  Referring provider: Es Saenz PA*  Visit Diagnosis:     ICD-10-CM    1. S/P arthroscopy of left knee Z98.890    2. Knee stiffness, left M25.662    3. Effusion of lower leg joint M25.469          Assessment:     Response to Intervention:  Excellent response to therapy.  Patient is very receptive to cuing and suggestions for gait and posture.  She is very motivated and compliant with PT.    Symptoms are consistent with:  Post op recovery.  Patient is appropriate to continue with skilled physical therapy intervention, as indicated by initial plan of care.    Goal Status:  Pt. will demonstrate/verbalize independence in self-management of condition in : 4 weeks;Progressing toward  Pt. will be independent with home exercise program in : 4 weeks;Progressing toward  Pt. will be able to walk : with no pain;in 4 weeks;for household mobility;with less difficulty;Progressing toward  Patient will ascend / descend: stairs;with railing;with recipricol gait;with no pain;in 4 weeks;Progressing toward  No Data Recorded  Other functional progress:           Plan / Patient Education:     Continue with initial plan of care.  Progress with home program as tolerated.       Subjective:     Pain Ratin    Knee continues to improve everyday.    She is able to be active a little longer each day before it gets stiff.    Felt great the rest of the day after last visit.    Sometimes she doesn't notice it at all.         Objective:       Left knee AROM 0-114 .  Post treatment 0-122.    Circumference  Left 42 cm.    Manual Therapy  MFR layers 1-3 left; quad, hamstring, gastroc,articularis genu.     Manual therapy:  left patella superior mobilization     Rate/grade Target  Direction  Relative movement Location in range Patient position   2,3 Inferior border of patella Superior   Superior glide of  "patella in the femoral groove. Full knee extension  Supine        Fibular head pistoning grade 2,3 hooklying.       Exercises below represent all exercise the patient has done in the clinic and HEP.    Please see today's exercise flow-sheet for specifics of today's exercise performance.     Exercises:  Exercise #1: LAQ  without weight  Comment #1: 10 bilateral 2-3x/day  Verbal, written/picture instruction provided for HEP.  Exercise #2: heel slide stretch  Comment #2: x 8  Exercise #3: supine hamstring glide  Comment #3: 10 bilateral 2-3x/day  Verbal, written/picture instruction provided for HEP.  Exercise #4: sidelying hip abduction  Comment #4: 10 bilateral 2-3x/day  Verbal, written/picture instruction provided for HEP.  Exercise #5: upright stationary bike,  seat height 6  Comment #5: 4 minutes , level 5, full circles forward.  Exercise #6: gait training --brisk to normal speed, cuing for heel strike, push off, knee extension, smooth equal pattern.  Exercise #7: gastroc stretch wedge  Comment #7: 30\" x 2 bilateral  Exercise #8: hamstring stretch  Comment #8: 30\" x 2        Treatment Today    TREATMENT MINUTES COMMENTS   Evaluation     Self-care/ Home management     Manual therapy 15 See above.   Neuromuscular Re-education     Therapeutic Activity     Therapeutic Exercises 10 See exercise flow-sheet for details.    Gait training     Modality__________________                Total 25    Blank areas are intentional and mean the treatment did not include these items.       Christopher Acevedo, PT  6/26/2017     "

## 2021-06-11 NOTE — PROGRESS NOTES
[facility] Daily Progress     Patient Name: Ghada Ochoa  Date: 7/3/2017  Visit #: 7  Referral Diagnosis: Left knee pain  Referring provider: Es Saenz PA*  Visit Diagnosis:     ICD-10-CM    1. S/P arthroscopy of left knee Z98.890    2. Knee stiffness, left M25.662    3. Effusion of lower leg joint M25.469    4. Post-op pain G89.18          Assessment:   Patient presented with increased ROM in L knee flexion since last session. She stated a concern of the slow progression she is having in recovery and was hoping it would all go faster. PT progressed exercises and continued with manual therapy to address increased effusion  and stiffness in the knee.     Response to Intervention:  Excellent response to therapy.  Patient is very receptive to cuing and suggestions for gait and posture.  She is very motivated and compliant with PT.     Symptoms are consistent with:  Post op recovery.  Patient is appropriate to continue with skilled physical therapy intervention, as indicated by initial plan of care.    Goal Status:  Pt. will demonstrate/verbalize independence in self-management of condition in : 4 weeks;Progressing toward  Pt. will be independent with home exercise program in : 4 weeks;Progressing toward  Pt. will be able to walk : with no pain;in 4 weeks;for household mobility;with less difficulty;Progressing toward  Patient will ascend / descend: stairs;with railing;with recipricol gait;with no pain;in 4 weeks;Progressing toward  No Data Recorded  Other functional progress:      Plan / Patient Education:     Continue with initial plan of care.  Progress with home program as tolerated.  Bike, possible trials of total gym vs regular squats, lunges, lateral movement, balance.    Subjective:   Patient is a little frustrated because she thought she would have a faster recovery. Patient feels as if she is progressing well just wants it to be faster. She has a hard time walking the dog and does not  "have ability to walk for a long period of time. She went to the  for the first time trying to do as much as the class as she could, she felt progress in this. Patient notes compliance to HEP.  Continued improvement noted from the manual therapy.    Pain Rating: no pain just tightness lack of mobility, numbness      Objective:       Left knee AROM 0-119 .  Post treatment 0-121. Measured on wall    Circumference  Left 42 cm.    Manual Therapy  MFR layers 1-3 left; quad, hamstring, gastroc, articularis genu,fibularis longus  Patella mobilization- inferior glide  Knee distraction with flexion    Exercises below represent all exercise the patient has done in the clinic and HEP.    Please see today's exercise flow-sheet for specifics of today's exercise performance.     Exercises:  Exercise #1: LAQ  without weight  Comment #1: 10 bilateral 2-3x/day  Verbal, written/picture instruction provided for HEP.  Exercise #2: heel slide stretch  Comment #2: x 8  Exercise #3: supine hamstring glide  Comment #3: 10 bilateral 2-3x/day  Verbal, written/picture instruction provided for HEP.  Exercise #4: sidelying hip abduction  Comment #4: 10 bilateral 2-3x/day  Verbal, written/picture instruction provided for HEP.  Exercise #5: upright stationary bike,  seat height 6  Comment #5: 4 minutes , level 5, full circles forward.  Exercise #6: gait training --brisk to normal speed, cuing for heel strike, push off, knee extension, smooth equal pattern.  Exercise #7: gastroc stretch wedge  Comment #7: 30\" x 2 bilateral  Exercise #8: hamstring stretch  Comment #8: 30\" x 2   Exercise #9: SLR supine x10 deng  Comment #9: SLS x30sec deng        Treatment Today    TREATMENT MINUTES COMMENTS   Evaluation     Self-care/ Home management     Manual therapy 12 Patient positioned in supine-TPR to L articularis genu, L patella mobilization inferior glide- patient in hooklying,   Patient positioned seated on plinth-L  knee distraction with varying degrees of " flexion    Neuromuscular Re-education     Therapeutic Activity     Therapeutic Exercises 15    Gait training     Modality__________________                Total 27    Blank areas are intentional and mean the treatment did not include these items.     Nikia Ruiz, SPT  Venecia Connor, PT  7/3/2017

## 2021-06-11 NOTE — PROGRESS NOTES
[facility]   Knee Initial Evaluation    Patient Name: Ghada Ochoa  Date of evaluation: 6/13/2017  Referral Diagnosis: Left knee pain  Referring provider: Es Saenz PA*  Visit Diagnosis:     ICD-10-CM    1. S/P arthroscopy of left knee Z98.890    2. Knee stiffness, left M25.662    3. Post-op pain G89.18    4. Effusion of lower leg joint M25.469        Assessment:      Impairments in  pain, posture, ROM, joint mobility, strength, gait/locomotion and balance, swelling  Patient's signs and symptoms are consistent with post operative recovery from arthroscopic removal of torn section of lef tknee meniscus..  Patient responded well to manual therapy and progression of her HEP..  Prognosis to achieve goals is  good   Pt. is appropriate for skilled PT intervention as outlined in the Plan of Care (POC).    Goals:  Pt. will demonstrate/verbalize independence in self-management of condition in : 4 weeks  Pt. will be independent with home exercise program in : 4 weeks  Pt. will be able to walk : with no pain;in 4 weeks;for household mobility;with less difficulty  Patient will ascend / descend: stairs;with railing;with recipricol gait;with no pain;in 4 weeks  No Data Recorded    Patient's expectations/goals are realistic.    Barriers to Learning or Achieving Goals:  No Barriers.       Plan / Patient Instructions:      Plan of Care:   Communication with: Referral Source  Patient Related Instruction: Nature of Condition;Treatment plan and rationale;Self Care instruction;Basis of treatment;Body mechanics;Posture;Precautions;Next steps;Expected outcome  Times per Week: 2  Number of Weeks: 4  Number of Visits: 8  Discharge Planning:    Precautions / Restrictions :  none  Therapeutic Exercise: ROM;Stretching;Strengthening  Neuromuscular Reeducation: posture;balance/proprioception;TNE;core  Manual Therapy: soft tissue mobilization;myofascial release;joint mobilization;muscle energy  Modalities: cold pack;hot  pack (prn)  Gait Training:       Plan for next visit: gait assessment, balance assessment, bike, total gym, continue manual therapy as appropriate.      Subjective:          History of Present Illness:    Ghada is a 52 y.o. female who presents to therapy today with complaints of left knee pain and swelling following left knee arthroscopy 17.  Symptoms are constant and getting better. She denies history of similar symptoms. She describes her previous level of function as not limited    Pain Ratin  Pain rating at best: 3  Pain rating at worst: 8  Pain description:aching, pain, soreness and weakness    Functional limitations are described as occurring with:   ascending and descending stairs or curbs  balance  walking      Patient reports benefit from:  movement or exercise , anti-inflammatory, cold       Objective:      Note: Items left blank indicates the item was not performed or not indicated at the time of the evaluation.    Patient Outcome Measures :    Lower Extremity Functional Scale (_/80): 41   Scores range from 0-80, where a score of 80 represents maximum function. The minimal clinically important difference is a positive change of 9 points.    Knee Examination  1. S/P arthroscopy of left knee     2. Knee stiffness, left     3. Post-op pain     4. Effusion of lower leg joint       Precautions/Restrictions:  None  Involved Side: Left  Posture Observation:      General sitting posture is  fair.  General standing posture is fair.  Lower extremity:  Knee:  Bilateral genu valgus.  Foot/Ankle:  toe out  bilateral   Assistive Device: None  Gait Observation: slight increase in lateral sway, very mild limp,    Knee ROM      Date:  17    AROM in degrees  Right   Left  Right   Left  Right   Left       Knee Flexion  (130 )   145   110                   Knee Extension  (0 )   0-5   0                 PROM in degrees  Right   Left  Right   Left  Right   Left       Knee Flexion  (130 )                          Knee Extension  (0 )                       LE Strength                 Date:  6/13/17   Strength (MMT/5)  Right   Left  Right   Left  Right   Left       Hip Flexion   5 4                     Hip Abduction   4 4                     Hip Adduction   4  4                    Hip Extension   4  4                    Hip Internal Rotation                         Hip External Rotation                         Knee Extension   4+ 4+                     Knee Flexion   4+   4+                   Ankle Dorsiflexion   5   5                   Ankle Plantarflexion                       Flexibility:  Limited hamstring, quadriceps, gastroc on left.    Palpation:  Myofascial restriction with tenderness:  Left hamstring, quadriceps, fibularis longus, gastroc  Left patella:  Mod-severe hypomobility.    Knee Special Tests (+/-):       Knee OA Cluster   Right   Left   Ligament Tests   Right   Left    1. > 51 y/o           Lachman          2. Stiffness > 30 min.           Anterior Drawer          3. Crepitus           Posterior Drawer          4. Bony tenderness           Posterior Sag          5. Bone enlargement           Valgus Stress          6. No warmth to the touch           Varus Stress           Meniscal Tests   Right   Left    Other   Right    Left       Denis's           Ely's             Joint line tenderness           Denilson             Thessaly Thomas Apley's                      Exercises:  Exercise #1: LAQ  without weight  Comment #1: 10 bilateral 2-3x/day  Verbal, written/picture instruction provided for HEP.  Exercise #2: prone knee flexion  Comment #2: 10 bilateral 2-3x/day  Verbal, written/picture instruction provided for HEP.  Exercise #3: supine hamstring glide  Comment #3: 10 bilateral 2-3x/day  Verbal, written/picture instruction provided for HEP.  Exercise #4: sidelying hip abduction  Comment #4: 10 bilateral 2-3x/day  Verbal, written/picture instruction provided for HEP.     Manual  therapy  MFR layers 1-3 left; quad, hamstring, gastroc, fibularis    Manual therapy:  left patella superior mobilization    Rate/grade Target  Direction  Relative movement Location in range Patient position   2,3 Inferior border of patella Superior   Superior glide of patella in the femoral groove. Full knee extension  Supine       Manual therapy:  left patella medial mobilization    Rate/grade Target  Direction  Relative movement Location in range Patient position   2,3 Lateral border of patella Medial   Medial glide of patella on the femoral groove. Full knee extension  Supine        Left proximal fibularis mobilization PA and AP grade 2-3 patient hooklying.    Treatment Today      TREATMENT MINUTES COMMENTS   Evaluation 15    Self-care/ Home management     Manual therapy 15 See above   Neuromuscular Re-education     Therapeutic Activity     Therapeutic Exercises 5 See exercise flow-sheet for details.    Gait training     Modality__________________                Total 35    Blank areas are intentional and mean the treatment did not include these items.     PT Evaluation Code: (Please list factors)  Patient History/Comorbidities: none    Examination: left knee swelling, pain , ROM deficit, limp.  Clinical Presentation: stable  Clinical Decision Making: low    Patient History/  Comorbidities Examination  (body structures and functions, activity limitations, and/or participation restrictions) Clinical Presentation Clinical Decision Making (Complexity)   No documented Comorbidities or personal factors 1-2 Elements Stable and/or uncomplicated Low   1-2 documented comorbidities or personal factor 3 Elements Evolving clinical presentation with changing characteristics Moderate   3-4 documented comorbidities or personal factors 4 or more Unstable and unpredictable High               Christopher Acevedo  6/13/2017  12:46 PM

## 2021-06-11 NOTE — PROGRESS NOTES
1. Ovarian mass  US Pelvis With Transvaginal Non OB   2. Visit for screening mammogram  Mammo Screening Bilateral   3. Colon cancer screening  Ambulatory referral for Colonoscopy   4. Need for vaccination           ASSESSMENT/PLAN:     The following high BMI interventions were performed this visit: exercise promotion: stretching and exercise promotion: walking/step counting    1. Ovarian mass    - US Pelvis With Transvaginal Non OB; Future  She has a history of having a mass and is due for her yearly follow up but her insurance was requiring a referral  She does [facility] and has her pap smears performed there, SMITA completed today to get those records    2. Visit for screening mammogram    - Mammo Screening Bilateral; Future  Monthly BSE, she has not had a mammogram since 2013    3. Colon cancer screening    - Ambulatory referral for Colonoscopy    4. Need for vaccination    declined      There are no Patient Instructions on file for this visit.  There are no discontinued medications.  Return in about 4 weeks (around 10/29/2020) for annual physical, fasting labs, pap.        Lety Brandt NP          SUBJECTIVE:  Ghada Ochoa is a 55 y.o. female presents today at the request of her insurance company.  She has a history of having a right sided ovarian mass and has yearly pelvic ultrasounds performed for surveillance.  She is overdue for this so her insurance is requiring a formal referral for this.  She denies any concerning symptoms today such as abdominal pain with bloating or abnormal vaginal spotting.  She does see her gynecologist at [facility] where her Pap smears are performed.  She is overdue for mammogram, her last mammogram was performed in 2013.  She is also overdue for Pap smear.  She is also due for colon cancer screening.  She states she has not been doing well with taking care of her own health since her  has been having issues with his own prostate cancer diagnosis.  She is due for flu vaccine, annual physical and fasting lab work.   Chief Complaint   Patient presents with     Referral     scan         Patient Active Problem List   Diagnosis     Lateral meniscus tear     Knee pain, left     Atypical mole       Current Outpatient Medications   Medication Sig Dispense Refill     LACTOBAC CMB #3/FOS/PANTETHINE (PROBIOTIC & ACIDOPHILUS ORAL) Take by mouth.       MULTIVIT WITH CALCIUM,IRON,MIN (MULTIPLE VITAMIN, WOMENS ORAL) Take by mouth.       No current facility-administered medications for this visit.        Social History     Tobacco Use   Smoking Status Former Smoker   Smokeless Tobacco Never Used   Tobacco Comment    one year as a teen       REVIEW OF SYSTEMS: Denies abdominal pain, changes in bowel habits, diarrhea, constipation, nausea, vomiting, rectal bleeding, melena, new/unusual/ uncooked foods, no sick contacts or recent travel.       TOBACCO USE:  Social History     Tobacco Use   Smoking Status Former Smoker   Smokeless Tobacco Never Used   Tobacco Comment    one year as a teen     Social History     Socioeconomic History     Marital status:      Spouse name: Toribio     Number of children: 5     Years of education: 14     Highest education level: Not on file   Occupational History     Employer: [employer]   Social Needs     Financial resource strain: Not on file     Food insecurity     Worry: Not on file     Inability: Not on file     Transportation needs     Medical: Not on file     Non-medical: Not on file   Tobacco Use     Smoking status: Former Smoker     Smokeless tobacco: Never Used     Tobacco comment: one year as a teen   Substance and Sexual Activity     Alcohol use: Yes     Alcohol/week: 0.8 standard drinks     Types: 1 Standard drinks or equivalent per week     Comment: 2-3 per week     Drug use: No     Sexual activity: Yes     Partners: Male     Birth control/protection: Surgical   Lifestyle     Physical activity     Days per week: Not  on file     Minutes per session: Not on file     Stress: Not on file   Relationships     Social connections     Talks on phone: Not on file     Gets together: Not on file     Attends Bahai service: Not on file     Active member of club or organization: Not on file     Attends meetings of clubs or organizations: Not on file     Relationship status: Not on file     Intimate partner violence     Fear of current or ex partner: Not on file     Emotionally abused: Not on file     Physically abused: Not on file     Forced sexual activity: Not on file   Other Topics Concern     Not on file   Social History Narrative     Toribio, with met prostate cancer. 5 children, 2 grand children, one dog: Jeronimo         OBJECTIVE:            Vitals:    10/01/20 0811   BP: 118/82   Pulse: 76   Resp: 18   Temp: 98.2  F (36.8  C)   SpO2: 98%     Weight: 201 lb 5 oz (91.3 kg)    Wt Readings from Last 3 Encounters:   10/01/20 201 lb 5 oz (91.3 kg)   04/27/18 184 lb 4 oz (83.6 kg)   01/30/18 181 lb (82.1 kg)     Body mass index is 31.53 kg/m .        Physical Exam:  GENERAL APPEARANCE: A&A, NAD, well hydrated, well nourished  NECK: Supple, without lymphadenopathy, no thyroid mass, no JVD, no bruit  CV: RRR, no M/G/R   LUNGS: CTAB, normal respiratory effort  ABDOMEN: S&NT, no masses, no organomegaly, BS present x4   SKIN:  Normal skin turgor, no lesions/rashes, warm and dry

## 2021-06-14 NOTE — TELEPHONE ENCOUNTER
New Appointment Needed  What is the reason for the visit:    Mantoux Placement  Appt Request  What is the purpose of the mantoux?:  Work: ASAP  Is there a form to be completed?:   Yes  How soon do you need the mantoux placed?:  date: ASAP    Provider Preference: PCP only  How soon do you need to be seen?: next week  Waitlist offered?: No  Okay to leave a detailed message:  Yes

## 2021-06-14 NOTE — PROGRESS NOTES
[facility] Discharge Summary  Patient Name: Ghada Ochoa  Date: 11/29/2017  Referral Diagnosis: L knee pain  Referring provider: Es Saenz PA*  Visit Diagnosis:   1. S/P arthroscopy of left knee     2. Knee stiffness, left     3. Effusion of lower leg joint     4. Post-op pain         Goals:  See below    Patient was seen for 7 visits from 6/13/17 to 7/7/17 with 0 missed appointments.  The patient discontinued therapy, did not return.    Therapy will be discontinued at this time.  The patient will need a new referral to resume.    Thank you for your referral.  Venecia Connor  11/29/2017  10:27 AM

## 2021-06-15 PROBLEM — M25.562 KNEE PAIN, LEFT: Status: ACTIVE | Noted: 2017-05-19

## 2021-06-15 PROBLEM — S83.289A LATERAL MENISCUS TEAR: Status: ACTIVE | Noted: 2017-05-19

## 2021-06-15 NOTE — PROGRESS NOTES
Office Visit - New Patient   Ghada Ochoa   52 y.o.  female    Date of visit: 1/30/2018  Physician: Lety Brandt CNP     Assessment and Plan   1. Establishing care with new doctor, encounter for     2. Atypical mole  Ambulatory referral to Dermatology   3. Need for vaccination  Tdap vaccine greater than or equal to 8yo IM   4. Visit for screening mammogram  Mammo Screening Bilateral     Med list reconciled      The following high BMI interventions were performed this visit: encouragement to exercise and weight monitoring    Return to clinic in the next 3 to 6 months for annual physical with fasting lab work. Patient is overdue. Declined Colorguard and colonoscopy referral today.     Chief Complaint   Chief Complaint   Patient presents with     [facility]        Patient Profile   Social History     Social History Narrative     Toribio, with met prostate cancer. 5 children, 2 grand children, one dog: Jeronimo        Past Medical History   Patient Active Problem List   Diagnosis     Lateral meniscus tear     Knee pain, left     Establishing care with new doctor, encounter for     Atypical mole       Past Surgical History  She has a past surgical history that includes partial oopherectomy due to 9 cm mass; Tonsillectomy and adenoidectomy; Laparoscopic cholecystectomy; Appendectomy; and Knee arthroscopy w/ meniscectomy (Left, 5/24/2017).     History of Present Illness   This 52 y.o. old who presents to [facility].  Medical, family and surgical history reviewed with patient.  Patient lives with her  Toribio who is currently undergoing double hormone therapy for his metastatic prostate cancer at this time.  Her  underwent prostatectomy in 2015 and the cancer returned one year later.  To some degree, she does feel she is suffering from anxiety and depression from this, her echo 7 score is 4 and PHQ 9 score is 5 today.  She does feel she has a very supportive  network of friends, she does work out at her gym and takes her dog on walks in order to cope with her anxiety and depression symptoms.  She does not feel she is in need of any medication or mental health counseling at this time.  She does point out that she has an abnormal looking mole on the upper portion above her left eye on the eyebrow.  She would like referral to dermatology to have this evaluated and possibly biopsied.  She works full-time as a home health aide, she does drink 3 alcoholic beverages per week, she does not smoke or use illicit drugs.  Her and her  have 5 children and she has 2 grandchildren.  Surgical history reviewed: Partial oophorectomy due to 9 cm ovarian mass, tonsil and adenoid removal, gallbladder removal, appendix removal and left knee meniscus surgery.  First place for patient to have mammogram completed, reviewed 2013 results with her, no signs of malignancy at that time.  Tdap vaccination administered today, she declined the influenza vaccine.  She does have her Pap smear is performed at her OB/GYN clinic, release of records form completed for this to have on file.     Review of Systems: A comprehensive review of systems was negative except as noted.     Medications and Allergies   Current Outpatient Prescriptions   Medication Sig Dispense Refill     LACTOBAC CMB #3/FOS/PANTETHINE (PROBIOTIC & ACIDOPHILUS ORAL) Take by mouth.       MULTIVIT WITH CALCIUM,IRON,MIN (MULTIPLE VITAMIN, WOMENS ORAL) Take by mouth.       No current facility-administered medications for this visit.      No Known Allergies     Family and Social History   Family History   Problem Relation Age of Onset     Heart disease Father      Cancer Father      waldenstrom's macroglobulinemia     Cancer Sister      brain tumor     No Medical Problems Brother      No Medical Problems Brother      No Medical Problems Brother      No Medical Problems Brother      No Medical Problems Brother         Social History  "  Substance Use Topics     Smoking status: Former Smoker     Smokeless tobacco: Never Used      Comment: one year as a teen     Alcohol use 0.5 oz/week     1 Standard drinks or equivalent per week      Comment: 2-3 per week        Physical Exam   General Appearance:   Alert and oriented ×4, no acute distress, pleasant    /78  Pulse 76  Temp 97.9  F (36.6  C)  Resp 14  Ht 5' 7\" (1.702 m)  Wt 181 lb (82.1 kg)  SpO2 99%  Breastfeeding? No  BMI 28.35 kg/m2    EYES: Eyelids, conjunctiva, and sclera were normal. Pupils were normal. Cornea, iris, and lens were normal bilaterally.  1.5 x 1.5 cm mole on left eyebrow, annular, dry and scaly appearing, flat, light brown in color  NECK: Neck appearance was normal. There were no neck masses and the thyroid was not enlarged.  RESPIRATORY: Breathing pattern was normal and the chest moved symmetrically.  Percussion/auscultatory percussion was normal.  Lung sounds were normal and there were no abnormal sounds.  CARDIOVASCULAR: Heart rate and rhythm were normal.  S1 and S2 were normal and there were no extra sounds or murmurs. Peripheral pulses in arms and legs were normal.  Jugular venous pressure was normal.  There was no peripheral edema.  GASTROINTESTINAL: The abdomen was normal in contour.  Bowel sounds were present.  Percussion detected no organ enlargement or tenderness.  Palpation detected no tenderness, mass, or enlarged organs.   SKIN/HAIR/NAILS: Skin color was normal.  There were no skin lesions.  Hair and nails were normal. See EYE for mole description  PSYCHIATRIC:  Mood and affect were normal and the patient had normal recent and remote memory. The patient's judgment and insight were normal.         Additional Information     Review and/or order of clinical lab tests:  Review and/or order of radiology tests:  Review and/or order of medicine tests:  Discussion of test results with performing physician:  Decision to obtain old records and/or obtain history " from someone other than the patient:  Review and summarization of old records and/or obtaining history from someone other than the patient and.or discussion of case with another health care provider:  Independent visualization of image, tracing or specimen itself:    Time: total time spent with the patient was 45 minutes of which >50% was spent in counseling and coordination of care     Lety Brandt, CNP  Family Nurse Practitioner  [facility]  [phone]

## 2021-06-16 PROBLEM — D22.9 ATYPICAL MOLE: Status: ACTIVE | Noted: 2018-01-30

## 2021-06-17 NOTE — PROGRESS NOTES
Assessment:     1. Bronchitis, acute  amoxicillin-clavulanate (AUGMENTIN) 875-125 mg per tablet          Plan:     -Discussed with the patient about the treatment plan.  We will treat patient with antibiotics for bronchitis questionable sinusitis.  Advised patient to increase fluid intake.  May take ibuprofen or Tylenol for pain or fever.  Monitor for worsening symptoms.  Follow-up with PCP if symptoms does not resolve after treatment.  Patient verbalized understanding the plan of care.    Subjective:       52 y.o. female presents for evaluation of a cough, chest pressure, bronchitis, and sinus congestion ×2 weeks.  Patient reports that she feels like her chest is heavy especially when she is coughing.  She has been taking over-the-counter medications for the past 2 weeks without any symptoms relief.  She denies a fever.  She she was exposed to her  who was sick with pneumonia and was treated with antibiotics.  She reports shortness of breath, her cough wakes her up at night.  She denies nausea, vomiting or fever.        The following portions of the patient's history were reviewed and updated as appropriate: allergies, current medications, past family history, past medical history, past social history, past surgical history and problem list.    Review of Systems  A 12 point comprehensive review of systems was negative except as noted.     Objective:      /74 (Patient Site: Right Arm, Patient Position: Sitting, Cuff Size: Adult Large)  Pulse (!) 104  Temp 98.3  F (36.8  C) (Oral)   Resp 16  Wt 184 lb 4 oz (83.6 kg)  SpO2 97%  BMI 28.86 kg/m2  General appearance: alert, appears stated age, cooperative and moderate distress  Head: Normocephalic, without obvious abnormality, atraumatic, sinuses tender to percussion  Eyes: conjunctivae/corneas clear. PERRL, EOM's intact. Fundi benign.  Ears: abnormal TM right ear - bulging and air-fluid level and abnormal TM left ear - bulging and air-fluid  level  Nose: Nares normal. Septum midline. Mucosa normal. No drainage or sinus tenderness., moderate congestion, sinus tenderness bilateral  Throat: abnormal findings: moderate oropharyngeal erythema  Lungs: clear to auscultation bilaterally  Heart: regular rate and rhythm, S1, S2 normal, no murmur, click, rub or gallop  Lymph nodes: Cervical, supraclavicular, and axillary nodes normal.  Neurologic: Grossly normal     This note has been dictated using voice recognition software. Any grammatical or context distortions are unintentional and inherent to the software

## 2021-07-21 ENCOUNTER — RECORDS - HEALTHEAST (OUTPATIENT)
Dept: ADMINISTRATIVE | Facility: CLINIC | Age: 56
End: 2021-07-21

## 2021-08-21 ENCOUNTER — HEALTH MAINTENANCE LETTER (OUTPATIENT)
Age: 56
End: 2021-08-21

## 2021-10-16 ENCOUNTER — HEALTH MAINTENANCE LETTER (OUTPATIENT)
Age: 56
End: 2021-10-16

## 2022-02-01 ENCOUNTER — TRANSFERRED RECORDS (OUTPATIENT)
Dept: HEALTH INFORMATION MANAGEMENT | Facility: CLINIC | Age: 57
End: 2022-02-01
Payer: COMMERCIAL

## 2022-04-14 ENCOUNTER — LAB (OUTPATIENT)
Dept: LAB | Facility: CLINIC | Age: 57
End: 2022-04-14
Payer: COMMERCIAL

## 2022-04-14 ENCOUNTER — DOCUMENTATION ONLY (OUTPATIENT)
Dept: LAB | Facility: CLINIC | Age: 57
End: 2022-04-14
Payer: COMMERCIAL

## 2022-04-14 DIAGNOSIS — Z11.1 SCREENING EXAMINATION FOR PULMONARY TUBERCULOSIS: Primary | ICD-10-CM

## 2022-04-14 DIAGNOSIS — Z11.1 SCREENING EXAMINATION FOR PULMONARY TUBERCULOSIS: ICD-10-CM

## 2022-04-14 PROCEDURE — 86481 TB AG RESPONSE T-CELL SUSP: CPT

## 2022-04-14 PROCEDURE — 36415 COLL VENOUS BLD VENIPUNCTURE: CPT

## 2022-04-15 LAB
QUANTIFERON MITOGEN: 10 IU/ML
QUANTIFERON NIL TUBE: 0.11 IU/ML
QUANTIFERON TB1 TUBE: 0.12 IU/ML
QUANTIFERON TB2 TUBE: 0.13

## 2022-04-16 LAB
GAMMA INTERFERON BACKGROUND BLD IA-ACNC: 0.11 IU/ML
M TB IFN-G BLD-IMP: NEGATIVE
M TB IFN-G CD4+ BCKGRND COR BLD-ACNC: 9.89 IU/ML
MITOGEN IGNF BCKGRD COR BLD-ACNC: 0.01 IU/ML
MITOGEN IGNF BCKGRD COR BLD-ACNC: 0.02 IU/ML

## 2022-09-25 ENCOUNTER — HEALTH MAINTENANCE LETTER (OUTPATIENT)
Age: 57
End: 2022-09-25

## 2023-02-07 ENCOUNTER — OFFICE VISIT (OUTPATIENT)
Dept: FAMILY MEDICINE | Facility: CLINIC | Age: 58
End: 2023-02-07
Payer: COMMERCIAL

## 2023-02-07 VITALS
HEIGHT: 67 IN | SYSTOLIC BLOOD PRESSURE: 136 MMHG | WEIGHT: 201.6 LBS | BODY MASS INDEX: 31.64 KG/M2 | DIASTOLIC BLOOD PRESSURE: 80 MMHG | HEART RATE: 69 BPM | OXYGEN SATURATION: 97 %

## 2023-02-07 DIAGNOSIS — N89.8 VAGINAL ITCHING: Primary | ICD-10-CM

## 2023-02-07 DIAGNOSIS — R21 RASH: ICD-10-CM

## 2023-02-07 LAB
CLUE CELLS: NORMAL
TRICHOMONAS, WET PREP: NORMAL
WBC'S/HIGH POWER FIELD, WET PREP: NORMAL
YEAST, WET PREP: NORMAL

## 2023-02-07 PROCEDURE — 87210 SMEAR WET MOUNT SALINE/INK: CPT | Performed by: FAMILY MEDICINE

## 2023-02-07 PROCEDURE — 99213 OFFICE O/P EST LOW 20 MIN: CPT | Performed by: FAMILY MEDICINE

## 2023-02-07 RX ORDER — TRIAMCINOLONE ACETONIDE 1 MG/G
CREAM TOPICAL 2 TIMES DAILY
Qty: 30 G | Refills: 0 | Status: SHIPPED | OUTPATIENT
Start: 2023-02-07

## 2023-02-07 NOTE — ASSESSMENT & PLAN NOTE
This looks like a contact dermatitis.  If not improved with steroid cream or recurrent, refer to derm.

## 2023-02-07 NOTE — PROGRESS NOTES
"      Problem List Items Addressed This Visit     Rash     This looks like a contact dermatitis.  If not improved with steroid cream or recurrent, refer to derm.         Relevant Medications    triamcinolone (KENALOG) 0.1 % external cream    Vaginal itching - Primary    Relevant Medications    triamcinolone (KENALOG) 0.1 % external cream    Other Relevant Orders    REVIEW OF HEALTH MAINTENANCE PROTOCOL ORDERS (Completed)    Wet prep          Marie Urrutia is a 57 year old who presents for the following health issues   Chief Complaint   Patient presents with     Derm Problem     Rash on and off for over a year in the back of the ear and rash on neck since this past Saturday      Vaginal Problem     Vaginal itching on and off for a while        1) She has had a rash on her neck starting 5 days ago.  Used Aquaphor and went away but now back.  Also has rash behind her ears.  Rash is very itchy.  No new foods.  Not located anywhere else on body. She switched vitamins 2 weeks ago.  No personal care products.  No new necklaces.    2) Has had vaginal itching comes and goes.  No significant discharge. Uses OTC antifungal med and gets better.  No recent antibiotics.      Patient Active Problem List   Diagnosis     Lateral meniscus tear     Knee pain, left     Atypical mole     Rash     Vaginal itching          Vaginal Problem     History of Present Illness       Reason for visit:  Yeast infection?  Symptom onset:  1-3 days ago    She eats 4 or more servings of fruits and vegetables daily.She exercises with enough effort to increase her heart rate 30 to 60 minutes per day.  She exercises with enough effort to increase her heart rate 5 days per week.   She is taking medications regularly.       Review of Systems   Genitourinary: Positive for vaginal discharge.            Objective    /80 (BP Location: Left arm, Patient Position: Sitting, Cuff Size: Adult Regular)   Pulse 69   Ht 1.702 m (5' 7\")   Wt 91.4 kg (201 " lb 9.6 oz)   LMP  (LMP Unknown)   SpO2 97%   BMI 31.58 kg/m    Body mass index is 31.58 kg/m .  Physical Exam   Skin: Fine papular rash with hint of vesicles over anterior neck.  She has erythematous macular rash behind ears.  : Normal female genitalia with no discharge/ lesions/ rash.    Adelaida Kerr MD

## 2023-07-26 ENCOUNTER — TRANSFERRED RECORDS (OUTPATIENT)
Dept: HEALTH INFORMATION MANAGEMENT | Facility: CLINIC | Age: 58
End: 2023-07-26

## 2023-08-28 ENCOUNTER — OFFICE VISIT (OUTPATIENT)
Dept: FAMILY MEDICINE | Facility: CLINIC | Age: 58
End: 2023-08-28
Payer: COMMERCIAL

## 2023-08-28 VITALS
SYSTOLIC BLOOD PRESSURE: 112 MMHG | RESPIRATION RATE: 16 BRPM | TEMPERATURE: 97.9 F | HEART RATE: 72 BPM | WEIGHT: 200 LBS | HEIGHT: 66 IN | DIASTOLIC BLOOD PRESSURE: 80 MMHG | BODY MASS INDEX: 32.14 KG/M2 | OXYGEN SATURATION: 97 %

## 2023-08-28 DIAGNOSIS — L21.9 SEBORRHEIC DERMATITIS: ICD-10-CM

## 2023-08-28 DIAGNOSIS — Z12.11 SCREEN FOR COLON CANCER: ICD-10-CM

## 2023-08-28 DIAGNOSIS — Z12.31 VISIT FOR SCREENING MAMMOGRAM: ICD-10-CM

## 2023-08-28 DIAGNOSIS — Z11.59 NEED FOR HEPATITIS C SCREENING TEST: ICD-10-CM

## 2023-08-28 DIAGNOSIS — Z00.00 ROUTINE GENERAL MEDICAL EXAMINATION AT A HEALTH CARE FACILITY: Primary | ICD-10-CM

## 2023-08-28 PROBLEM — N89.8 VAGINAL ITCHING: Status: RESOLVED | Noted: 2023-02-07 | Resolved: 2023-08-28

## 2023-08-28 PROBLEM — R21 RASH: Status: RESOLVED | Noted: 2023-02-07 | Resolved: 2023-08-28

## 2023-08-28 PROBLEM — S83.289A LATERAL MENISCUS TEAR: Status: RESOLVED | Noted: 2017-05-19 | Resolved: 2023-08-28

## 2023-08-28 LAB
ALBUMIN SERPL BCG-MCNC: 4.5 G/DL (ref 3.5–5.2)
ALP SERPL-CCNC: 102 U/L (ref 35–104)
ALT SERPL W P-5'-P-CCNC: 14 U/L (ref 0–50)
ANION GAP SERPL CALCULATED.3IONS-SCNC: 10 MMOL/L (ref 7–15)
AST SERPL W P-5'-P-CCNC: 24 U/L (ref 0–45)
BILIRUB SERPL-MCNC: 0.4 MG/DL
BUN SERPL-MCNC: 10.5 MG/DL (ref 8–23)
CALCIUM SERPL-MCNC: 9.8 MG/DL (ref 8.6–10)
CHLORIDE SERPL-SCNC: 103 MMOL/L (ref 98–107)
CHOLEST SERPL-MCNC: 236 MG/DL
CREAT SERPL-MCNC: 0.79 MG/DL (ref 0.51–0.95)
DEPRECATED HCO3 PLAS-SCNC: 27 MMOL/L (ref 22–29)
ERYTHROCYTE [DISTWIDTH] IN BLOOD BY AUTOMATED COUNT: 12.6 % (ref 10–15)
GFR SERPL CREATININE-BSD FRML MDRD: 86 ML/MIN/1.73M2
GLUCOSE SERPL-MCNC: 95 MG/DL (ref 70–99)
HCT VFR BLD AUTO: 39.6 % (ref 35–47)
HCV AB SERPL QL IA: NONREACTIVE
HDLC SERPL-MCNC: 74 MG/DL
HGB BLD-MCNC: 12.7 G/DL (ref 11.7–15.7)
LDLC SERPL CALC-MCNC: 143 MG/DL
MCH RBC QN AUTO: 28 PG (ref 26.5–33)
MCHC RBC AUTO-ENTMCNC: 32.1 G/DL (ref 31.5–36.5)
MCV RBC AUTO: 87 FL (ref 78–100)
NONHDLC SERPL-MCNC: 162 MG/DL
PLATELET # BLD AUTO: 333 10E3/UL (ref 150–450)
POTASSIUM SERPL-SCNC: 4.5 MMOL/L (ref 3.4–5.3)
PROT SERPL-MCNC: 7.2 G/DL (ref 6.4–8.3)
RBC # BLD AUTO: 4.54 10E6/UL (ref 3.8–5.2)
SODIUM SERPL-SCNC: 140 MMOL/L (ref 136–145)
TRIGL SERPL-MCNC: 93 MG/DL
TSH SERPL DL<=0.005 MIU/L-ACNC: 1.56 UIU/ML (ref 0.3–4.2)
WBC # BLD AUTO: 4.9 10E3/UL (ref 4–11)

## 2023-08-28 PROCEDURE — 99396 PREV VISIT EST AGE 40-64: CPT | Performed by: FAMILY MEDICINE

## 2023-08-28 PROCEDURE — 86803 HEPATITIS C AB TEST: CPT | Performed by: FAMILY MEDICINE

## 2023-08-28 PROCEDURE — 80061 LIPID PANEL: CPT | Performed by: FAMILY MEDICINE

## 2023-08-28 PROCEDURE — 85027 COMPLETE CBC AUTOMATED: CPT | Performed by: FAMILY MEDICINE

## 2023-08-28 PROCEDURE — 84443 ASSAY THYROID STIM HORMONE: CPT | Performed by: FAMILY MEDICINE

## 2023-08-28 PROCEDURE — 80053 COMPREHEN METABOLIC PANEL: CPT | Performed by: FAMILY MEDICINE

## 2023-08-28 PROCEDURE — 99213 OFFICE O/P EST LOW 20 MIN: CPT | Mod: 25 | Performed by: FAMILY MEDICINE

## 2023-08-28 PROCEDURE — 36415 COLL VENOUS BLD VENIPUNCTURE: CPT | Performed by: FAMILY MEDICINE

## 2023-08-28 RX ORDER — KETOCONAZOLE 20 MG/ML
SHAMPOO TOPICAL DAILY PRN
Qty: 120 ML | Refills: 11 | Status: SHIPPED | OUTPATIENT
Start: 2023-08-28

## 2023-08-28 RX ORDER — LACTOBACILLUS RHAMNOSUS GG 10B CELL
1 CAPSULE ORAL 2 TIMES DAILY
COMMUNITY

## 2023-08-28 ASSESSMENT — ENCOUNTER SYMPTOMS
DIZZINESS: 0
ABDOMINAL PAIN: 0
HEMATOCHEZIA: 0
COUGH: 0
PARESTHESIAS: 1
CONSTIPATION: 0
SHORTNESS OF BREATH: 0
DIARRHEA: 0
EYE PAIN: 0
NERVOUS/ANXIOUS: 0
PALPITATIONS: 0
HEADACHES: 0
JOINT SWELLING: 0
HEMATURIA: 0
FREQUENCY: 0
NAUSEA: 0
BREAST MASS: 0
HEARTBURN: 0
SORE THROAT: 0
ARTHRALGIAS: 0
MYALGIAS: 0
CHILLS: 0
WEAKNESS: 0
FEVER: 0
DYSURIA: 0

## 2023-08-28 NOTE — PROGRESS NOTES
SUBJECTIVE:   CC: Ghada is an 58 year old who presents for preventive health visit.       2023     9:24 AM   Additional Questions   Roomed by Gen CARMEL CMA       Healthy Habits:     Getting at least 3 servings of Calcium per day:  Yes    Bi-annual eye exam:  NO    Dental care twice a year:  NO    Sleep apnea or symptoms of sleep apnea:  None    Diet:  Regular (no restrictions)    Frequency of exercise:  6-7 days/week    Duration of exercise:  30-45 minutes    Taking medications regularly:  Not Applicable    Medication side effects:  None    Additional concerns today:  Yes           Social History     Tobacco Use    Smoking status: Former     Passive exposure: Never    Smokeless tobacco: Never    Tobacco comments:     one year as a teen   Substance Use Topics    Alcohol use: Yes     Alcohol/week: 0.8 standard drinks of alcohol     Comment: Alcoholic Drinks/day: 2-3 per week         2023     9:19 AM   Alcohol Use   Prescreen: >3 drinks/day or >7 drinks/week? No          No data to display              Reviewed orders with patient.  Reviewed health maintenance and updated orders accordingly - Yes  Lab work is in process  Labs reviewed in EPIC    Breast Cancer Screenin/28/2023     9:19 AM   Breast CA Risk Assessment (FHS-7)   Do you have a family history of breast, colon, or ovarian cancer? No / Unknown         Mammogram Screening: Recommended mammography every 1-2 years with patient discussion and risk factor consideration  Pertinent mammograms are reviewed under the imaging tab.    History of abnormal Pap smear: NO - age 30-65 PAP every 5 years with negative HPV co-testing recommended     Reviewed and updated as needed this visit by clinical staff   Tobacco  Allergies  Meds  Problems  Med Hx  Surg Hx  Fam Hx          Reviewed and updated as needed this visit by Provider   Tobacco  Allergies  Meds  Problems  Med Hx  Surg Hx  Fam Hx             Review of Systems   Constitutional:   "Negative for chills and fever.   HENT:  Negative for congestion, ear pain, hearing loss and sore throat.    Eyes:  Negative for pain and visual disturbance.   Respiratory:  Negative for cough and shortness of breath.    Cardiovascular:  Negative for chest pain, palpitations and peripheral edema.   Gastrointestinal:  Negative for abdominal pain, constipation, diarrhea, heartburn, hematochezia and nausea.   Breasts:  Negative for tenderness, breast mass and discharge.   Genitourinary:  Negative for dysuria, frequency, genital sores, hematuria, pelvic pain, urgency, vaginal bleeding and vaginal discharge.   Musculoskeletal:  Negative for arthralgias, joint swelling and myalgias.   Skin:  Positive for rash.   Neurological:  Positive for paresthesias. Negative for dizziness, weakness and headaches.   Psychiatric/Behavioral:  Negative for mood changes. The patient is not nervous/anxious.          OBJECTIVE:   /80   Pulse 72   Temp 97.9  F (36.6  C) (Oral)   Resp 16   Ht 1.683 m (5' 6.25\")   Wt 90.7 kg (200 lb)   LMP  (LMP Unknown)   SpO2 97%   BMI 32.04 kg/m    Physical Exam  GENERAL APPEARANCE: healthy, alert and no distress  EYES: Eyes grossly normal to inspection, PERRL and conjunctivae and sclerae normal  HENT: ear canals and TM's normal, nose and mouth without ulcers or lesions, oropharynx clear and oral mucous membranes moist  NECK: no adenopathy, no asymmetry, masses, or scars and thyroid normal to palpation  RESP: lungs clear to auscultation - no rales, rhonchi or wheezes  BREAST: normal without masses, tenderness or nipple discharge and no palpable axillary masses or adenopathy  CV: regular rate and rhythm, normal S1 S2, no S3 or S4, no murmur, click or rub, no peripheral edema and peripheral pulses strong  ABDOMEN: soft, nontender, no hepatosplenomegaly, no masses and bowel sounds normal  MS: no musculoskeletal defects are noted and gait is age appropriate without ataxia  SKIN: scalp around " hairline patch of erythema, scaly rash.   NEURO: Normal strength and tone, sensory exam grossly normal, mentation intact and speech normal  PSYCH: mentation appears normal and affect normal/bright    Diagnostic Test Results:  Labs reviewed in Epic    ASSESSMENT/PLAN:   (Z00.00) Routine general medical examination at a health care facility  (primary encounter diagnosis)  Comment: encourage annual pe and vaccine up date  Plan: Lipid panel reflex to direct LDL Fasting,         Comprehensive metabolic panel (BMP + Alb, Alk         Phos, ALT, AST, Total. Bili, TP), TSH with free        T4 reflex, CBC with platelets        Pt declined pap smear, she prefer to do it at her ob/gyn office.     (Z12.11) Screen for colon cancer  Comment:   Plan: Colonoscopy Screening  Referral            (Z11.59) Need for hepatitis C screening test  Comment:   Plan: Hepatitis C Screen Reflex to HCV RNA Quant and         Genotype            (Z12.31) Visit for screening mammogram  Comment:   Plan: MA SCREENING DIGITAL BILAT - Future  (s+30)              (L21.9) Seborrheic dermatitis  Comment: t noticed of large hair loss with scalp itching in past one year. She stopped hair dye, and tried different shampoo and did not help. Exam scalp hairline has patch of erythema, scaly rash.  Likely she has Seborrheic dermatitis, hypothyroidism etc.   Plan: ketoconazole (NIZORAL) 2 % external shampoo        Advise to use ketoconazole shampoo. Will follow-up lab. Follow-up if not work.     Patient has been advised of split billing requirements and indicates understanding: Yes      COUNSELING:  Reviewed preventive health counseling, as reflected in patient instructions       Regular exercise       Healthy diet/nutrition       Vision screening       Hearing screening       Immunizations  Declined: Covid-19, Hepatitis B, and Zoster due to Concerns about side effects/safety             Osteoporosis prevention/bone health       Colorectal Cancer  "Screening       Consider lung cancer screening for ages 55-80 years (77 for Medicare) and 20 pack-year smoking history       BMI:   Estimated body mass index is 32.04 kg/m  as calculated from the following:    Height as of this encounter: 1.683 m (5' 6.25\").    Weight as of this encounter: 90.7 kg (200 lb).   Weight management plan: Discussed healthy diet and exercise guidelines      She reports that she has quit smoking. She has never been exposed to tobacco smoke. She has never used smokeless tobacco.          Daya De La Rosa MD  [facility]  "

## 2023-08-29 ENCOUNTER — TELEPHONE (OUTPATIENT)
Dept: FAMILY MEDICINE | Facility: CLINIC | Age: 58
End: 2023-08-29

## 2023-08-29 NOTE — TELEPHONE ENCOUNTER
Got the order for a colonoscopy back sent to [facility]( wrong place) need to re fax to correct location.    Felipa Nichols on 8/29/2023 at 12:04 PM

## 2023-08-30 NOTE — TELEPHONE ENCOUNTER
Order faxed to wrong location by specialty . Order refaxed to [facility] today per number listed on referral

## 2023-09-13 ENCOUNTER — HOSPITAL ENCOUNTER (OUTPATIENT)
Dept: MAMMOGRAPHY | Facility: CLINIC | Age: 58
Discharge: HOME | End: 2023-09-13
Attending: FAMILY MEDICINE | Admitting: FAMILY MEDICINE
Payer: COMMERCIAL

## 2023-09-13 DIAGNOSIS — Z12.31 VISIT FOR SCREENING MAMMOGRAM: ICD-10-CM

## 2023-09-13 PROCEDURE — 77067 SCR MAMMO BI INCL CAD: CPT

## 2023-09-25 ENCOUNTER — OFFICE VISIT (OUTPATIENT)
Dept: FAMILY MEDICINE | Facility: CLINIC | Age: 58
End: 2023-09-25
Payer: COMMERCIAL

## 2023-09-25 VITALS
HEIGHT: 66 IN | OXYGEN SATURATION: 99 % | SYSTOLIC BLOOD PRESSURE: 124 MMHG | BODY MASS INDEX: 32.49 KG/M2 | WEIGHT: 202.16 LBS | DIASTOLIC BLOOD PRESSURE: 70 MMHG | TEMPERATURE: 97.7 F | HEART RATE: 64 BPM

## 2023-09-25 DIAGNOSIS — H66.002 NON-RECURRENT ACUTE SUPPURATIVE OTITIS MEDIA OF LEFT EAR WITHOUT SPONTANEOUS RUPTURE OF TYMPANIC MEMBRANE: Primary | ICD-10-CM

## 2023-09-25 PROCEDURE — 99214 OFFICE O/P EST MOD 30 MIN: CPT | Performed by: STUDENT IN AN ORGANIZED HEALTH CARE EDUCATION/TRAINING PROGRAM

## 2023-09-25 RX ORDER — CLOBETASOL PROPIONATE 0.5 MG/ML
SOLUTION TOPICAL 2 TIMES DAILY
COMMUNITY

## 2023-09-25 ASSESSMENT — PAIN SCALES - GENERAL: PAINLEVEL: NO PAIN (0)

## 2023-09-25 NOTE — PROGRESS NOTES
Assessment and Plan   58-year-old female who presents with left otalgia.  Going on for the last 4 days.  Really not associated with other symptoms.  Her exam today is normal except for some bulging of the left TM but not red and structures able to be seen.  Thus I reassured her that I do not see an active infection at this point.  I recommended symptomatic management for now.  I gave her a delayed prescription for Augmentin to use in 5 days if her symptoms are not improving.    1. Non-recurrent acute suppurative otitis media of left ear without spontaneous rupture of tympanic membrane  - amoxicillin-clavulanate (AUGMENTIN) 875-125 MG tablet; Take 1 tablet by mouth 2 times daily for 10 days  Dispense: 20 tablet; Refill: 0    Follow up:     Options for treatment and follow-up care were reviewed with the patient and/or guardian. Ghada Ochoa and/or guardian engaged in the decision making process and verbalized understanding of the options discussed and agreed with the final plan.    Dr. Konstantin Morelos         HPI:   Ghada Ochoa is a 58 year old  female who presents for:    Chief Complaint   Patient presents with    Ear Problem     pain     Patient tells me over the last 4 days or so she has had pain in her left ear.  Not associated with hearing changes or dizziness.  Not associated with flulike symptoms such as fever, cough, sore throat.  She has had some runny nose but not significant sinus pressure.  She states the last time her ear hurt this much she had a ear infection on the left.         PMHX:     Patient Active Problem List   Diagnosis    Knee pain, left    Atypical mole       Social History     Tobacco Use    Smoking status: Former     Packs/day: 0.25     Years: 2.00     Pack years: 0.50     Types: Cigarettes     Quit date:      Years since quittin.7     Passive exposure: Never    Smokeless tobacco: Never    Tobacco comments:     one year as a teen   Vaping Use    Vaping Use: Never used  "  Substance Use Topics    Alcohol use: Yes     Alcohol/week: 0.8 standard drinks of alcohol     Comment: Alcoholic Drinks/day: 2-3 per week    Drug use: No       Social History     Social History Narrative     [place], with met prostate cancer. 5 children, 2 grand children, one dog: Jeronimo       No Known Allergies    No results found for this or any previous visit (from the past 24 hour(s)).         Review of Systems:    ROS: 10 point ROS neg other than the symptoms noted above in the HPI.         Physical Exam:     Vitals:    09/25/23 1254   BP: 124/70   Pulse: 64   Temp: 97.7  F (36.5  C)   SpO2: 99%   Weight: 91.7 kg (202 lb 2.6 oz)   Height: 1.676 m (5' 6\")     Body mass index is 32.63 kg/m .    General appearance: Alert, cooperative, no distress, appears stated age  Head: Normocephalic, atraumatic, without obvious abnormality  Eyes: Pupils equal round, reactive.  Conjunctiva clear.  Nose: Nares normal, no drainage.  Ears:  No cerumen, TM's grey dull with structures seen bilaterally  Throat: Lips, mucosa, tongue normal mucosa pink and moist  Neck: Supple, symmetric, trachea midline,          "

## 2024-02-11 ENCOUNTER — OFFICE VISIT (OUTPATIENT)
Dept: FAMILY MEDICINE | Facility: CLINIC | Age: 59
End: 2024-02-11
Payer: COMMERCIAL

## 2024-02-11 VITALS
WEIGHT: 202 LBS | SYSTOLIC BLOOD PRESSURE: 131 MMHG | TEMPERATURE: 98.1 F | DIASTOLIC BLOOD PRESSURE: 82 MMHG | HEART RATE: 80 BPM | BODY MASS INDEX: 32.6 KG/M2 | RESPIRATION RATE: 18 BRPM | OXYGEN SATURATION: 96 %

## 2024-02-11 DIAGNOSIS — J02.9 VIRAL PHARYNGITIS: Primary | ICD-10-CM

## 2024-02-11 DIAGNOSIS — R07.0 THROAT PAIN: ICD-10-CM

## 2024-02-11 LAB
DEPRECATED S PYO AG THROAT QL EIA: NEGATIVE
GROUP A STREP BY PCR: NOT DETECTED

## 2024-02-11 PROCEDURE — 87651 STREP A DNA AMP PROBE: CPT | Performed by: FAMILY MEDICINE

## 2024-02-11 PROCEDURE — 99213 OFFICE O/P EST LOW 20 MIN: CPT | Performed by: FAMILY MEDICINE

## 2024-02-11 NOTE — PROGRESS NOTES
Assessment:     Viral pharyngitis    Throat pain  - Streptococcus A Rapid Screen w/Reflex to PCR - Clinic Collect  - Group A Streptococcus PCR Throat Swab      Plan:     Patient with sore throat, likely viral pharyngitis.  Rapid strep screen is negative.  Recommend symptomatic care at home.  No antibiotics indicated at this time.  Discussed the typical course of symptoms.  Follow-up if getting worse or not improving over the next week.  Patient is agreeable with this plan.    Subjective:       58 year old female presents for evaluation of a 3-day history of sore throat.  She is also had some nasal congestion with some postnasal drip and a bit of a cough.  No fevers or chills or bodyaches.  Denies headache or abdominal pain.  No nausea or vomiting.  No shortness of breath or wheezing.    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       Past Surgical History:   Procedure Laterality Date    APPENDECTOMY      Description: Appendectomy;  Recorded: 05/28/2009;    ARTHROSCOPY KNEE WITH MENISCECTOMY Left 5/24/2017    Procedure: LEFT KNEE ARTHROSCOPY WITH SUBTOTAL LATERAL MENISCECTOMY;  Surgeon: Freddie Wright MD;  Location: [facility];  Service:     LAPAROSCOPIC CHOLECYSTECTOMY      Description: Cholecystectomy Laparoscopic;  Recorded: 05/28/2009;    OTHER SURGICAL HISTORY      partial oopherectomy due to 9 cm mass    TONSILLECTOMY & ADENOIDECTOMY      Description: Tonsillectomy With Adenoidectomy;  Recorded: 12/11/2007;       Current Outpatient Medications   Medication    clobetasol (TEMOVATE) 0.05 % external solution    lactobacillus rhamnosus, GG, (CULTURELL) capsule    MULTIVIT WITH CALCIUM,IRON,MIN (MULTIPLE VITAMIN, WOMENS ORAL)    ketoconazole (NIZORAL) 2 % external shampoo    triamcinolone (KENALOG) 0.1 %  external cream     No current facility-administered medications for this visit.       No Known Allergies    Family History   Problem Relation Age of Onset    Heart Disease Father     Cancer Father         waldenstrom's macroglobulinemia    Cancer Sister         brain tumor    No Known Problems Brother     No Known Problems Brother     No Known Problems Brother     No Known Problems Brother     No Known Problems Brother        Social History     Socioeconomic History    Marital status:      Spouse name: None    Number of children: 5    Years of education: 14    Highest education level: None   Tobacco Use    Smoking status: Former     Packs/day: 0.25     Years: 2.00     Additional pack years: 0.00     Total pack years: 0.50     Types: Cigarettes     Quit date:      Years since quittin.1     Passive exposure: Never    Smokeless tobacco: Never    Tobacco comments:     one year as a teen   Vaping Use    Vaping Use: Never used   Substance and Sexual Activity    Alcohol use: Yes     Alcohol/week: 0.8 standard drinks of alcohol     Comment: Alcoholic Drinks/day: 2-3 per week    Drug use: No    Sexual activity: Yes     Partners: Male     Birth control/protection: Surgical   Social History Narrative     [place], with met prostate cancer. 5 children, 2 grand children, one dog: Jeronimo     Social Determinants of Health     Financial Resource Strain: Low Risk  (2023)    Financial Resource Strain     Within the past 12 months, have you or your family members you live with been unable to get utilities (heat, electricity) when it was really needed?: No   Food Insecurity: Low Risk  (2023)    Food Insecurity     Within the past 12 months, did you worry that your food would run out before you got money to buy more?: No     Within the past 12 months, did the food you bought just not last and you didn t have money to get more?: No   Transportation Needs: Low Risk  (2023)    Transportation Needs      Within the past 12 months, has lack of transportation kept you from medical appointments, getting your medicines, non-medical meetings or appointments, work, or from getting things that you need?: No   Interpersonal Safety: Low Risk  (9/25/2023)    Interpersonal Safety     Do you feel physically and emotionally safe where you currently live?: Yes     Within the past 12 months, have you been hit, slapped, kicked or otherwise physically hurt by someone?: No     Within the past 12 months, have you been humiliated or emotionally abused in other ways by your partner or ex-partner?: No   Housing Stability: Low Risk  (9/25/2023)    Housing Stability     Do you have housing? : Yes     Are you worried about losing your housing?: No         Review of Systems  Pertinent items are noted in HPI.      Objective:                     General Appearance:    /82 (BP Location: Right arm, Patient Position: Sitting, Cuff Size: Adult Large)   Pulse 80   Temp 98.1  F (36.7  C) (Oral)   Resp 18   Wt 91.6 kg (202 lb)   LMP  (LMP Unknown)   SpO2 96%   BMI 32.60 kg/m          Alert, pleasant, cooperative, no distress, appears stated age   Head:    Normocephalic, without obvious abnormality, atraumatic   Eyes:    Conjunctiva/corneas clear   Ears:    Normal TM's without erythema or bulging. Normal external ear canals, both ears   Nose:   Nares normal, septum midline, mucosa normal, no drainage    or sinus tenderness   Throat: Tonsils surgically absent.  There are some mild erythema noted in the posterior oropharynx.  No exudates seen.  No mucosal lesions.   Neck: Mildly tender shotty anterior cervical and adenopathy noted.   Lungs:     Clear to auscultation bilaterally without wheezes, rales, or rhonchi, respirations unlabored    Heart:    Regular rate and rhythm, S1 and S2 normal, no murmur, rub or gallop       Extremities:   Extremities normal, atraumatic, no cyanosis or edema   Skin:   Skin color, texture, turgor normal, no  rashes or lesions           Results for orders placed or performed in visit on 02/11/24   Streptococcus A Rapid Screen w/Reflex to PCR - Clinic Collect     Status: Normal    Specimen: Throat; Swab   Result Value Ref Range    Group A Strep antigen Negative Negative       This note has been dictated using voice recognition software. Any grammatical or context distortions are unintentional and inherent to the software

## 2024-09-28 ENCOUNTER — HEALTH MAINTENANCE LETTER (OUTPATIENT)
Age: 59
End: 2024-09-28